# Patient Record
Sex: MALE
[De-identification: names, ages, dates, MRNs, and addresses within clinical notes are randomized per-mention and may not be internally consistent; named-entity substitution may affect disease eponyms.]

---

## 2018-02-23 ENCOUNTER — HOSPITAL ENCOUNTER (OUTPATIENT)
Dept: HOSPITAL 88 - US | Age: 83
End: 2018-02-23
Attending: UROLOGY
Payer: MEDICARE

## 2018-02-23 DIAGNOSIS — N39.46: Primary | ICD-10-CM

## 2018-02-23 PROCEDURE — 76770 US EXAM ABDO BACK WALL COMP: CPT

## 2018-02-23 NOTE — DIAGNOSTIC IMAGING REPORT
EXAM: Renal Ultrasound

INDICATION:        

\S\MIXED INCONTINENCE  

COMPARISON: None 

TECHNIQUE: Transverse and longitudinal images of the kidneys and bladder were

obtained.  



FINDINGS:     



Right Kidney: 

     Size: 11.5 cm

     Echogenicity: Normal   

     Parenchymal thickness: Normal 

     Collecting system: No hydronephrosis

     Stones: None

     Cyst/Mass: None



Left Kidney: 

     Size: 10.9 cm

     Echogenicity: Normal   

     Parenchymal thickness: Normal 

     Collecting system: No hydronephrosis

     Stones: None

     Cyst/Mass: None



Bladder: 

Not fully distended and appears unremarkable.



IMPRESSION:

Unremarkable renal ultrasound exam.



Signed by: Dr. Melecio London MD on 2/23/2018 11:53 AM

## 2018-03-24 ENCOUNTER — HOSPITAL ENCOUNTER (INPATIENT)
Dept: HOSPITAL 88 - ER | Age: 83
LOS: 11 days | Discharge: NURSING, REHAB OR LONG-TERM CARE | DRG: 698 | End: 2018-04-04
Attending: INTERNAL MEDICINE | Admitting: INTERNAL MEDICINE
Payer: MEDICARE

## 2018-03-24 VITALS — HEIGHT: 69 IN | BODY MASS INDEX: 27.46 KG/M2 | WEIGHT: 185.44 LBS

## 2018-03-24 VITALS — DIASTOLIC BLOOD PRESSURE: 67 MMHG | SYSTOLIC BLOOD PRESSURE: 161 MMHG

## 2018-03-24 DIAGNOSIS — Z16.35: ICD-10-CM

## 2018-03-24 DIAGNOSIS — N40.0: ICD-10-CM

## 2018-03-24 DIAGNOSIS — F03.90: ICD-10-CM

## 2018-03-24 DIAGNOSIS — Z95.810: ICD-10-CM

## 2018-03-24 DIAGNOSIS — I48.2: ICD-10-CM

## 2018-03-24 DIAGNOSIS — R07.89: ICD-10-CM

## 2018-03-24 DIAGNOSIS — Z79.01: ICD-10-CM

## 2018-03-24 DIAGNOSIS — I25.10: ICD-10-CM

## 2018-03-24 DIAGNOSIS — I11.0: ICD-10-CM

## 2018-03-24 DIAGNOSIS — J44.9: ICD-10-CM

## 2018-03-24 DIAGNOSIS — A41.51: ICD-10-CM

## 2018-03-24 DIAGNOSIS — I25.2: ICD-10-CM

## 2018-03-24 DIAGNOSIS — I73.9: ICD-10-CM

## 2018-03-24 DIAGNOSIS — I50.20: ICD-10-CM

## 2018-03-24 DIAGNOSIS — K21.9: ICD-10-CM

## 2018-03-24 DIAGNOSIS — E11.9: ICD-10-CM

## 2018-03-24 DIAGNOSIS — R13.10: ICD-10-CM

## 2018-03-24 DIAGNOSIS — T83.511A: Primary | ICD-10-CM

## 2018-03-24 DIAGNOSIS — B96.4: ICD-10-CM

## 2018-03-24 DIAGNOSIS — Z89.431: ICD-10-CM

## 2018-03-24 DIAGNOSIS — Z89.432: ICD-10-CM

## 2018-03-24 DIAGNOSIS — R42: ICD-10-CM

## 2018-03-24 LAB
ALBUMIN SERPL-MCNC: 2.6 G/DL (ref 3.5–5)
ALBUMIN/GLOB SERPL: 0.7 {RATIO} (ref 0.8–2)
ALP SERPL-CCNC: 273 IU/L (ref 40–150)
ALT SERPL-CCNC: 25 IU/L (ref 0–55)
ANION GAP SERPL CALC-SCNC: 11.6 MMOL/L (ref 8–16)
BACTERIA URNS QL MICRO: (no result) /HPF
BASOPHILS # BLD AUTO: 0 10*3/UL (ref 0–0.1)
BASOPHILS NFR BLD AUTO: 0.3 % (ref 0–1)
BILIRUB UR QL: NEGATIVE
BUN SERPL-MCNC: 21 MG/DL (ref 7–26)
BUN/CREAT SERPL: 22 (ref 6–25)
CALCIUM SERPL-MCNC: 8.6 MG/DL (ref 8.4–10.2)
CHLORIDE SERPL-SCNC: 99 MMOL/L (ref 98–107)
CK MB SERPL-MCNC: 0.6 NG/ML (ref 0–5)
CK SERPL-CCNC: 16 IU/L (ref 30–200)
CLARITY UR: (no result)
CO2 SERPL-SCNC: 30 MMOL/L (ref 22–29)
COLOR UR: YELLOW
DEPRECATED INR PLAS: 2.23
DEPRECATED NEUTROPHILS # BLD AUTO: 8.5 10*3/UL (ref 2.1–6.9)
DEPRECATED RBC URNS MANUAL-ACNC: (no result) /HPF (ref 0–5)
EGFRCR SERPLBLD CKD-EPI 2021: > 60 ML/MIN (ref 60–?)
EOSINOPHIL # BLD AUTO: 0 10*3/UL (ref 0–0.4)
EOSINOPHIL NFR BLD AUTO: 0 % (ref 0–6)
EPI CELLS URNS QL MICRO: (no result) /LPF
ERYTHROCYTE [DISTWIDTH] IN CORD BLOOD: 18.6 % (ref 11.7–14.4)
GLOBULIN PLAS-MCNC: 4 G/DL (ref 2.3–3.5)
GLUCOSE SERPLBLD-MCNC: 121 MG/DL (ref 74–118)
HCT VFR BLD AUTO: 44.6 % (ref 38.2–49.6)
HGB BLD-MCNC: 14.6 G/DL (ref 14–18)
KETONES UR QL STRIP.AUTO: (no result)
LEUKOCYTE ESTERASE UR QL STRIP.AUTO: (no result)
LYMPHOCYTES # BLD: 0.2 10*3/UL (ref 1–3.2)
LYMPHOCYTES NFR BLD AUTO: 2.6 % (ref 18–39.1)
MCH RBC QN AUTO: 30.4 PG (ref 28–32)
MCHC RBC AUTO-ENTMCNC: 32.7 G/DL (ref 31–35)
MCV RBC AUTO: 92.9 FL (ref 81–99)
MONOCYTES # BLD AUTO: 0.5 10*3/UL (ref 0.2–0.8)
MONOCYTES NFR BLD AUTO: 5.8 % (ref 4.4–11.3)
NEUTS SEG NFR BLD AUTO: 90.8 % (ref 38.7–80)
NITRITE UR QL STRIP.AUTO: NEGATIVE
PLATELET # BLD AUTO: 220 X10E3/UL (ref 140–360)
POTASSIUM SERPL-SCNC: 3.6 MMOL/L (ref 3.5–5.1)
PROT UR QL STRIP.AUTO: NEGATIVE
PROTHROMBIN TIME: 23.2 SECONDS (ref 11.9–14.5)
RBC # BLD AUTO: 4.8 X10E6/UL (ref 4.3–5.7)
SODIUM SERPL-SCNC: 137 MMOL/L (ref 136–145)
SP GR UR STRIP: 1.01 (ref 1.01–1.02)
UROBILINOGEN UR STRIP-MCNC: 1 MG/DL (ref 0.2–1)
WBC #/AREA URNS HPF: (no result) /HPF (ref 0–5)

## 2018-03-24 PROCEDURE — 93005 ELECTROCARDIOGRAM TRACING: CPT

## 2018-03-24 PROCEDURE — 74230 X-RAY XM SWLNG FUNCJ C+: CPT

## 2018-03-24 PROCEDURE — 78452 HT MUSCLE IMAGE SPECT MULT: CPT

## 2018-03-24 PROCEDURE — 96376 TX/PRO/DX INJ SAME DRUG ADON: CPT

## 2018-03-24 PROCEDURE — 99284 EMERGENCY DEPT VISIT MOD MDM: CPT

## 2018-03-24 PROCEDURE — 87040 BLOOD CULTURE FOR BACTERIA: CPT

## 2018-03-24 PROCEDURE — 93017 CV STRESS TEST TRACING ONLY: CPT

## 2018-03-24 PROCEDURE — 83735 ASSAY OF MAGNESIUM: CPT

## 2018-03-24 PROCEDURE — 84484 ASSAY OF TROPONIN QUANT: CPT

## 2018-03-24 PROCEDURE — 36415 COLL VENOUS BLD VENIPUNCTURE: CPT

## 2018-03-24 PROCEDURE — 80048 BASIC METABOLIC PNL TOTAL CA: CPT

## 2018-03-24 PROCEDURE — 51700 IRRIGATION OF BLADDER: CPT

## 2018-03-24 PROCEDURE — 87086 URINE CULTURE/COLONY COUNT: CPT

## 2018-03-24 PROCEDURE — 80076 HEPATIC FUNCTION PANEL: CPT

## 2018-03-24 PROCEDURE — 83690 ASSAY OF LIPASE: CPT

## 2018-03-24 PROCEDURE — 85025 COMPLETE CBC W/AUTO DIFF WBC: CPT

## 2018-03-24 PROCEDURE — 82150 ASSAY OF AMYLASE: CPT

## 2018-03-24 PROCEDURE — 87071 CULTURE AEROBIC QUANT OTHER: CPT

## 2018-03-24 PROCEDURE — 85610 PROTHROMBIN TIME: CPT

## 2018-03-24 PROCEDURE — 82553 CREATINE MB FRACTION: CPT

## 2018-03-24 PROCEDURE — 82550 ASSAY OF CK (CPK): CPT

## 2018-03-24 PROCEDURE — 82948 REAGENT STRIP/BLOOD GLUCOSE: CPT

## 2018-03-24 PROCEDURE — 87205 SMEAR GRAM STAIN: CPT

## 2018-03-24 PROCEDURE — 80053 COMPREHEN METABOLIC PANEL: CPT

## 2018-03-24 PROCEDURE — 93306 TTE W/DOPPLER COMPLETE: CPT

## 2018-03-24 PROCEDURE — 81001 URINALYSIS AUTO W/SCOPE: CPT

## 2018-03-24 PROCEDURE — 83605 ASSAY OF LACTIC ACID: CPT

## 2018-03-24 PROCEDURE — 71045 X-RAY EXAM CHEST 1 VIEW: CPT

## 2018-03-24 PROCEDURE — 76700 US EXAM ABDOM COMPLETE: CPT

## 2018-03-24 PROCEDURE — 87186 SC STD MICRODIL/AGAR DIL: CPT

## 2018-03-24 PROCEDURE — 87400 INFLUENZA A/B EACH AG IA: CPT

## 2018-03-24 RX ADMIN — HYDROCODONE BITARTRATE AND ACETAMINOPHEN PRN EA: 10; 325 TABLET ORAL at 17:30

## 2018-03-24 RX ADMIN — INSULIN HUMAN SCH UNIT: 100 INJECTION, SOLUTION PARENTERAL at 21:00

## 2018-03-24 RX ADMIN — SODIUM CHLORIDE SCH MLS/HR: 9 INJECTION, SOLUTION INTRAVENOUS at 17:30

## 2018-03-24 NOTE — XMS REPORT
Patient Summary Document

 Created on: 2018



HAL AVINA

External Reference #: [id number]

: 1933

Sex: Male



Demographics







 Address  [address] [place] [address]  [id number]

 

 Home Phone  [phone]

 

 Preferred Language  Unknown

 

 Marital Status  Unknown

 

 Shinto Affiliation  Unknown

 

 Race  Unknown

 

 Additional Race(s)  

 

 Ethnic Group  Unknown





Author







 [facility]  [facility]

 

 [facility]

 

 Address  Unknown

 

 Phone  Unavailable







Care Team Providers







 Care Team Member Name  Role  Phone

 

 FABIOLA ALEXANDER  Unavailable  Unavailable







Problems

This patient has no known problems.



Allergies, Adverse Reactions, Alerts

This patient has no known allergies or adverse reactions.



Medications

This patient has no known medications.



Results







 Test Description  Test Time  Test Comments  Text Results  Atomic Results  
Result Comments









 US RENAL RETROPERITONEAL COMP            [place] [address]      Patient Name: 
HAL AVINA   MR #: [id number]    : 1933 Age/Sex: 84/M  Acct #: 
[id number] Req #: [id number]  Adm Physician:     Ordered by: FABIOLA ALEXANDER MD  Report #: [id number]   Location: US  Room/Bed:     __________________________
_________________________________________________________________________    
Procedure: [id number] US/US RENAL RETROPERITONEAL COMP  Exam Date:              
               Exam Time:        REPORT STATUS: Signed    EXAM: Renal 
Ultrasound   INDICATION:              COMPARISON: None    TECHNIQUE: Transverse 
and longitudinal images of the kidneys and bladder were   obtained.        
FINDINGS:           Right Kidney:         Size: 11.5 cm        Echogenicity: 
Normal           Parenchymal thickness: Normal         Collecting system: No 
hydronephrosis        Stones: None        Cyst/Mass: None      Left Kidney:    
     Size: 10.9 cm        Echogenicity: Normal           Parenchymal thickness: 
Normal         Collecting system: No hydronephrosis        Stones: None        
Cyst/Mass: None      Bladder:    Not fully distended and appears unremarkable. 
     IMPRESSION:   Unremarkable renal ultrasound exam.      Signed by: Dr. Melecio London MD on 2018 11:53 AM        Dictated By: MELECIO LONDON MD  
Electronically Signed By: MELECIO LONDON MD on 18 1152  Transcribed By: 
SHALA on 18 1150       COPY TO:   FABIOLA ALEXANDER MD

## 2018-03-24 NOTE — DIAGNOSTIC IMAGING REPORT
Examination: Single AP view of the chest.



COMPARISON: None.



INDICATION: Fever, AMS

    

IMPRESSION:

 

1.  Lines and Tubes: Left upper chest multilead cardiac device.

2.  Enlarged cardiac silhouette, central pulmonary venous congestion and mild

perihilar interstitial edema, likely representing mildly decompensated CHF..

3.  No consolidation or effusion.  

4.  No acute bony abnormalities.



Signed by: Dr. J Carlos Nguyen M.D. on 3/24/2018 4:38 PM

## 2018-03-25 VITALS — DIASTOLIC BLOOD PRESSURE: 58 MMHG | SYSTOLIC BLOOD PRESSURE: 122 MMHG

## 2018-03-25 VITALS — SYSTOLIC BLOOD PRESSURE: 151 MMHG | DIASTOLIC BLOOD PRESSURE: 64 MMHG

## 2018-03-25 VITALS — DIASTOLIC BLOOD PRESSURE: 70 MMHG | SYSTOLIC BLOOD PRESSURE: 158 MMHG

## 2018-03-25 VITALS — SYSTOLIC BLOOD PRESSURE: 130 MMHG | DIASTOLIC BLOOD PRESSURE: 60 MMHG

## 2018-03-25 VITALS — SYSTOLIC BLOOD PRESSURE: 161 MMHG | DIASTOLIC BLOOD PRESSURE: 67 MMHG

## 2018-03-25 VITALS — SYSTOLIC BLOOD PRESSURE: 141 MMHG | DIASTOLIC BLOOD PRESSURE: 80 MMHG

## 2018-03-25 VITALS — SYSTOLIC BLOOD PRESSURE: 122 MMHG | DIASTOLIC BLOOD PRESSURE: 56 MMHG

## 2018-03-25 LAB
ANION GAP SERPL CALC-SCNC: 15.7 MMOL/L (ref 8–16)
BASOPHILS # BLD AUTO: 0 10*3/UL (ref 0–0.1)
BASOPHILS NFR BLD AUTO: 0.3 % (ref 0–1)
BUN SERPL-MCNC: 22 MG/DL (ref 7–26)
BUN/CREAT SERPL: 26 (ref 6–25)
CALCIUM SERPL-MCNC: 8.6 MG/DL (ref 8.4–10.2)
CHLORIDE SERPL-SCNC: 101 MMOL/L (ref 98–107)
CK MB SERPL-MCNC: 1.1 NG/ML (ref 0–5)
CK MB SERPL-MCNC: 1.4 NG/ML (ref 0–5)
CK SERPL-CCNC: 53 IU/L (ref 30–200)
CK SERPL-CCNC: 55 IU/L (ref 30–200)
CO2 SERPL-SCNC: 27 MMOL/L (ref 22–29)
DEPRECATED NEUTROPHILS # BLD AUTO: 8.3 10*3/UL (ref 2.1–6.9)
EGFRCR SERPLBLD CKD-EPI 2021: > 60 ML/MIN (ref 60–?)
EOSINOPHIL # BLD AUTO: 0 10*3/UL (ref 0–0.4)
EOSINOPHIL NFR BLD AUTO: 0.1 % (ref 0–6)
ERYTHROCYTE [DISTWIDTH] IN CORD BLOOD: 18.6 % (ref 11.7–14.4)
GLUCOSE SERPLBLD-MCNC: 118 MG/DL (ref 74–118)
HCT VFR BLD AUTO: 44 % (ref 38.2–49.6)
HGB BLD-MCNC: 14.5 G/DL (ref 14–18)
LYMPHOCYTES # BLD: 0.4 10*3/UL (ref 1–3.2)
LYMPHOCYTES NFR BLD AUTO: 4.4 % (ref 18–39.1)
MCH RBC QN AUTO: 30.7 PG (ref 28–32)
MCHC RBC AUTO-ENTMCNC: 33 G/DL (ref 31–35)
MCV RBC AUTO: 93.2 FL (ref 81–99)
MONOCYTES # BLD AUTO: 0.8 10*3/UL (ref 0.2–0.8)
MONOCYTES NFR BLD AUTO: 8.1 % (ref 4.4–11.3)
NEUTS SEG NFR BLD AUTO: 86.5 % (ref 38.7–80)
PLATELET # BLD AUTO: 218 X10E3/UL (ref 140–360)
POTASSIUM SERPL-SCNC: 3.7 MMOL/L (ref 3.5–5.1)
RBC # BLD AUTO: 4.72 X10E6/UL (ref 4.3–5.7)
SODIUM SERPL-SCNC: 140 MMOL/L (ref 136–145)

## 2018-03-25 RX ADMIN — SODIUM CHLORIDE SCH GM: 9 INJECTION, SOLUTION INTRAVENOUS at 16:49

## 2018-03-25 RX ADMIN — FUROSEMIDE SCH MG: 20 TABLET ORAL at 16:49

## 2018-03-25 RX ADMIN — INSULIN HUMAN SCH UNIT: 100 INJECTION, SOLUTION PARENTERAL at 19:59

## 2018-03-25 RX ADMIN — INSULIN HUMAN SCH UNIT: 100 INJECTION, SOLUTION PARENTERAL at 07:30

## 2018-03-25 RX ADMIN — LATANOPROST SCH ML: 50 SOLUTION OPHTHALMIC at 20:42

## 2018-03-25 RX ADMIN — HYDROCODONE BITARTRATE AND ACETAMINOPHEN PRN EA: 10; 325 TABLET ORAL at 08:49

## 2018-03-25 RX ADMIN — INSULIN HUMAN SCH UNIT: 100 INJECTION, SOLUTION PARENTERAL at 11:30

## 2018-03-25 RX ADMIN — CLONAZEPAM SCH MG: 0.5 TABLET ORAL at 20:42

## 2018-03-25 RX ADMIN — CLONAZEPAM SCH MG: 0.5 TABLET ORAL at 16:49

## 2018-03-25 RX ADMIN — Medication SCH MG: at 20:43

## 2018-03-25 RX ADMIN — INSULIN HUMAN SCH UNIT: 100 INJECTION, SOLUTION PARENTERAL at 16:30

## 2018-03-25 RX ADMIN — MEMANTINE HYDROCHLORIDE SCH MG: 10 TABLET ORAL at 16:49

## 2018-03-25 RX ADMIN — ASPIRIN SCH MG: 81 TABLET, COATED ORAL at 08:49

## 2018-03-25 RX ADMIN — LATANOPROST SCH ML: 50 SOLUTION OPHTHALMIC at 20:47

## 2018-03-25 RX ADMIN — Medication SCH MG: at 20:42

## 2018-03-25 RX ADMIN — Medication SCH MG: at 16:49

## 2018-03-25 RX ADMIN — SODIUM CHLORIDE SCH MLS/HR: 9 INJECTION, SOLUTION INTRAVENOUS at 13:12

## 2018-03-25 NOTE — PROGRESS NOTE
DATE:  March 25, 2018 



CARDIOLOGY PROGRESS NOTE



SUBJECTIVE:  Patient is without any complaints this morning.  He denies any 

chest pain or shortness of breath.  States that he is tired.



CARDIOVASCULAR MEDICATIONS:  Aspirin 81 mg p.o. daily.



OBJECTIVE:

VITAL SIGNS:  Temperature 97.3, pulse 79, respiratory rate 18, blood 

pressure 130/60, oxygen saturation 96% on room air.

GENERAL:  Alert and oriented x2, resting comfortably in bed, does not 

appear to be in any acute distress.

CARDIOVASCULAR:  Regular rate and rhythm.  S4 gallop.

LUNGS:  Diminished breath sounds posterior lower lobes, otherwise scattered 

rhonchi.  No wheezing or crackles noted.

ABDOMEN:  Rounded, soft, nontender.

EXTREMITIES:  Lower extremities, trace pedal pulses.



LABS:  WBC 9.60, hemoglobin 14.5, hematocrit 44.0, and platelets 218.  

Sodium 140, potassium 3.7, BUN 22, creatinine 0.86.  PT 23.2, INR 2.23.  

Chest x-ray reviewed from yesterday with no acute bony abnormality, no 

consolidation or effusion, central pulmonary venous congestion, mildly 

decompensated CHF.



TELEMETRY:  Ventricularly paced rhythm.



ASSESSMENT

1. Atypical chest pain.

2. Peripheral arterial disease, status post multiple interventions 

recently.

3. Permanent atrial fibrillation.

4. Recent transmetatarsal amputation.



RECOMMENDATIONS:  We will be reconciling meds this morning.  Continuation 

of home meds including anticoagulation.  Scheduled for a stress test 

tomorrow morning.  Monitor closely.  We will continue to follow closely.



Dictated By:  Amaris Freeman NP









DD:  03/25/2018 10:36

DT:  03/25/2018 11:05

Job#:  [id number] [facility]

## 2018-03-25 NOTE — CONSULTATION
DATE OF CONSULTATION:  March 24, 2018 



CARDIOLOGY CONSULTATION NOTE



REASON FOR CONSULTATION:  Chest pain.



HISTORY:  Mr. Lozada is a patient of mine for the last several years.  He 

comes into the emergency room from his nursing home with complaints of 

fever, shaking chills as well as chest pain.  He has peripheral arterial 

disease.  He underwent recent intervention on both his lower extremities 

successfully.  He has bilateral transmet amputations.  He lives in a 

nursing home.



At the time of admission, his temperature was 100 degrees Fahrenheit, and 

his chest x-ray showed mildly decompensated heart failure.



PAST MEDICAL, SOCIAL, AND FAMILY HISTORY:  Unchanged from prior visit.



ALLERGIES:  NO KNOWN DRUG ALLERGIES.



REVIEW OF SYSTEMS:  Unobtainable.



PHYSICAL EXAMINATION:

VITAL SIGNS:  Temperature 100 degrees, heart rate 80, blood pressure 

127/80.  O2 sat is 96%.

CARDIOVASCULAR:  Regular rhythm.  S4 gallop.

LUNGS:  Decreased breath sounds.  Occasional rhonchi.

EXTREMITIES:  Pedal pulses are trace positive.



LABORATORY DATA:  Hemoglobin is 14.6.  Serum creatinine is normal.  Cardiac 

enzymes are negative.



ASSESSMENT:

1. Atypical chest pain.

2. Peripheral arterial disease.

3. Atrial fibrillation.



RECOMMENDATIONS:  Anticoagulation should be continued.  The patient was 

scheduled for a stress test in my office.  We will perform this while he is 

inpatient in the hospital.



I thank Dr. Burns for this consultation.







DD:  03/24/2018 18:39

DT:  03/25/2018 00:14

Job#:  [id number]

## 2018-03-25 NOTE — DIAGNOSTIC IMAGING REPORT
EXAM: XR CHEST 1 VIEW



DATE: 3/25/2018 7:00 AM



INDICATION: CHF, fever 



COMPARISON: 3/24/2018



FINDINGS:



Lines and Tubes: Left chest wall pacemaker with lead overlying right ventricle

and coronary sinus.



Heart and Mediastinum: Heart is enlarged. Tortuous aorta.



Lungs and Pleura: Scattered mild to moderate bilateral airspace opacities.



Bones and Soft Tissues: No acute findings.



IMPRESSION: 

1.  Enlarged cardiac silhouette with mild to moderate edema. Superimposed

pneumonia not excluded.



Signed by: Dr. Enzo Seymour MD on 3/25/2018 12:46 PM

## 2018-03-26 VITALS — DIASTOLIC BLOOD PRESSURE: 59 MMHG | SYSTOLIC BLOOD PRESSURE: 131 MMHG

## 2018-03-26 VITALS — SYSTOLIC BLOOD PRESSURE: 131 MMHG | DIASTOLIC BLOOD PRESSURE: 59 MMHG

## 2018-03-26 VITALS — SYSTOLIC BLOOD PRESSURE: 144 MMHG | DIASTOLIC BLOOD PRESSURE: 63 MMHG

## 2018-03-26 VITALS — DIASTOLIC BLOOD PRESSURE: 60 MMHG | SYSTOLIC BLOOD PRESSURE: 140 MMHG

## 2018-03-26 VITALS — DIASTOLIC BLOOD PRESSURE: 65 MMHG | SYSTOLIC BLOOD PRESSURE: 136 MMHG

## 2018-03-26 VITALS — SYSTOLIC BLOOD PRESSURE: 153 MMHG | DIASTOLIC BLOOD PRESSURE: 70 MMHG

## 2018-03-26 VITALS — SYSTOLIC BLOOD PRESSURE: 150 MMHG | DIASTOLIC BLOOD PRESSURE: 67 MMHG

## 2018-03-26 RX ADMIN — CLONAZEPAM SCH MG: 0.5 TABLET ORAL at 16:00

## 2018-03-26 RX ADMIN — MEMANTINE HYDROCHLORIDE SCH MG: 10 TABLET ORAL at 16:24

## 2018-03-26 RX ADMIN — INSULIN HUMAN SCH UNIT: 100 INJECTION, SOLUTION PARENTERAL at 11:30

## 2018-03-26 RX ADMIN — Medication SCH MG: at 09:00

## 2018-03-26 RX ADMIN — Medication SCH MG: at 22:17

## 2018-03-26 RX ADMIN — SODIUM CHLORIDE SCH MLS/HR: 9 INJECTION, SOLUTION INTRAVENOUS at 22:16

## 2018-03-26 RX ADMIN — POTASSIUM CHLORIDE SCH MEQ: 1500 TABLET, EXTENDED RELEASE ORAL at 09:00

## 2018-03-26 RX ADMIN — FUROSEMIDE SCH MG: 20 TABLET ORAL at 09:00

## 2018-03-26 RX ADMIN — ASPIRIN SCH MG: 81 TABLET, COATED ORAL at 09:00

## 2018-03-26 RX ADMIN — TAMSULOSIN HYDROCHLORIDE SCH MG: 0.4 CAPSULE ORAL at 09:00

## 2018-03-26 RX ADMIN — PANTOPRAZOLE SODIUM SCH MG: 40 TABLET, DELAYED RELEASE ORAL at 07:30

## 2018-03-26 RX ADMIN — CLONAZEPAM SCH MG: 0.5 TABLET ORAL at 09:00

## 2018-03-26 RX ADMIN — INSULIN HUMAN SCH UNIT: 100 INJECTION, SOLUTION PARENTERAL at 21:00

## 2018-03-26 RX ADMIN — MEMANTINE HYDROCHLORIDE SCH MG: 10 TABLET ORAL at 09:00

## 2018-03-26 RX ADMIN — METOPROLOL TARTRATE SCH MG: 25 TABLET, FILM COATED ORAL at 09:00

## 2018-03-26 RX ADMIN — RIVAROXABAN SCH MG: 10 TABLET, FILM COATED ORAL at 09:00

## 2018-03-26 RX ADMIN — FUROSEMIDE SCH MG: 20 TABLET ORAL at 16:24

## 2018-03-26 RX ADMIN — INSULIN HUMAN SCH UNIT: 100 INJECTION, SOLUTION PARENTERAL at 07:30

## 2018-03-26 RX ADMIN — LATANOPROST SCH ML: 50 SOLUTION OPHTHALMIC at 21:00

## 2018-03-26 RX ADMIN — SODIUM CHLORIDE SCH GM: 9 INJECTION, SOLUTION INTRAVENOUS at 16:24

## 2018-03-26 RX ADMIN — INSULIN HUMAN SCH UNIT: 100 INJECTION, SOLUTION PARENTERAL at 15:40

## 2018-03-26 RX ADMIN — Medication SCH MG: at 16:24

## 2018-03-26 RX ADMIN — CLONAZEPAM SCH MG: 0.5 TABLET ORAL at 22:17

## 2018-03-26 RX ADMIN — ASPIRIN 81 MG CHEWABLE TABLET SCH MG: 81 TABLET CHEWABLE at 09:00

## 2018-03-26 NOTE — CARDIOLOGY REPORT
DATE OF STUDY:  March 26, 2018 



PROCEDURE TITLE  Rest stress single isotope SPECT imaging with 

pharmacologic stress and gated SPECT imaging.



INDICATIONS:  Chest pain.



PROCEDURE:  Pharmacologic stress testing was performed with regadenoson per 

protocol.  Heart rate was 80 beats per minute at rest and increased to 83 

beats per minute during the regadenoson infusion.  The rest blood pressure 

was is 130/70 and increased to 151/71 mmHg, which is a normal response.   

The patient did not develop any significant symptoms during the procedure.  

Resting electrocardiogram demonstrated V-paced.  There were no ST segment 

changes consistent with myocardial ischemia.  Occasional PVCs were noted in 

recovery.  



Myocardial perfusion imaging was performed at rest following the injection 

of 10.3 millicuries of tetrofosmin.  At peak pharmacologic effect, the 

patient was injected with 29 mCi of tetrofosmin.  Gated post stress 

tomographic imaging was performed.



FINDINGS:

The overall quality of the study is fair.  Left ventricular cavity is noted 

to be normal size on the rest and stress studies.  SPECT images demonstrate 

a small mild inferolateral perfusion defect at rest that improves but does 

not completely reverse with stress.  Gated SPECT imaging demonstrates 

hypokinesis of the inferolateral wall.  The left ventricular ejection 

pressure was calculated to be 57%.  



IMPRESSION:  Myocardial perfusion imaging is abnormal.  There is a small 

non-transmural scar in the inferolateral wall.  Overall left ventricular 

systolic function is normal.  Overall left ventricular systolic function 

was normal with regional wall abnormalities as documented above.  











DD:  03/26/2018 18:38

DT:  03/26/2018 19:15

Job#:  [id number]



cc:   KAYLEE FINK MD 



MTDD

## 2018-03-27 VITALS — SYSTOLIC BLOOD PRESSURE: 105 MMHG | DIASTOLIC BLOOD PRESSURE: 57 MMHG

## 2018-03-27 VITALS — SYSTOLIC BLOOD PRESSURE: 135 MMHG | DIASTOLIC BLOOD PRESSURE: 60 MMHG

## 2018-03-27 VITALS — DIASTOLIC BLOOD PRESSURE: 63 MMHG | SYSTOLIC BLOOD PRESSURE: 136 MMHG

## 2018-03-27 VITALS — DIASTOLIC BLOOD PRESSURE: 73 MMHG | SYSTOLIC BLOOD PRESSURE: 136 MMHG

## 2018-03-27 VITALS — DIASTOLIC BLOOD PRESSURE: 71 MMHG | SYSTOLIC BLOOD PRESSURE: 148 MMHG

## 2018-03-27 VITALS — SYSTOLIC BLOOD PRESSURE: 136 MMHG | DIASTOLIC BLOOD PRESSURE: 73 MMHG

## 2018-03-27 VITALS — DIASTOLIC BLOOD PRESSURE: 63 MMHG | SYSTOLIC BLOOD PRESSURE: 132 MMHG

## 2018-03-27 VITALS — DIASTOLIC BLOOD PRESSURE: 62 MMHG | SYSTOLIC BLOOD PRESSURE: 130 MMHG

## 2018-03-27 RX ADMIN — Medication SCH MG: at 10:19

## 2018-03-27 RX ADMIN — Medication SCH MG: at 22:30

## 2018-03-27 RX ADMIN — RIVAROXABAN SCH MG: 10 TABLET, FILM COATED ORAL at 10:19

## 2018-03-27 RX ADMIN — CLONAZEPAM SCH MG: 0.5 TABLET ORAL at 10:19

## 2018-03-27 RX ADMIN — ASPIRIN 81 MG CHEWABLE TABLET SCH MG: 81 TABLET CHEWABLE at 10:18

## 2018-03-27 RX ADMIN — MEMANTINE HYDROCHLORIDE SCH MG: 10 TABLET ORAL at 17:01

## 2018-03-27 RX ADMIN — INSULIN HUMAN SCH UNIT: 100 INJECTION, SOLUTION PARENTERAL at 11:30

## 2018-03-27 RX ADMIN — INSULIN HUMAN SCH UNIT: 100 INJECTION, SOLUTION PARENTERAL at 07:30

## 2018-03-27 RX ADMIN — INSULIN HUMAN SCH UNIT: 100 INJECTION, SOLUTION PARENTERAL at 21:00

## 2018-03-27 RX ADMIN — CLONAZEPAM SCH MG: 0.5 TABLET ORAL at 17:00

## 2018-03-27 RX ADMIN — POTASSIUM CHLORIDE SCH MEQ: 1500 TABLET, EXTENDED RELEASE ORAL at 10:19

## 2018-03-27 RX ADMIN — MEMANTINE HYDROCHLORIDE SCH MG: 10 TABLET ORAL at 10:19

## 2018-03-27 RX ADMIN — ASPIRIN SCH MG: 81 TABLET, COATED ORAL at 10:18

## 2018-03-27 RX ADMIN — METOPROLOL TARTRATE SCH MG: 25 TABLET, FILM COATED ORAL at 10:19

## 2018-03-27 RX ADMIN — TAMSULOSIN HYDROCHLORIDE SCH MG: 0.4 CAPSULE ORAL at 10:18

## 2018-03-27 RX ADMIN — LATANOPROST SCH ML: 50 SOLUTION OPHTHALMIC at 21:00

## 2018-03-27 RX ADMIN — Medication SCH MG: at 10:18

## 2018-03-27 RX ADMIN — PANTOPRAZOLE SODIUM SCH MG: 40 TABLET, DELAYED RELEASE ORAL at 10:18

## 2018-03-27 RX ADMIN — SODIUM CHLORIDE SCH MLS/HR: 9 INJECTION, SOLUTION INTRAVENOUS at 05:12

## 2018-03-27 RX ADMIN — MEROPENEM SCH GM: 1 INJECTION INTRAVENOUS at 19:19

## 2018-03-27 RX ADMIN — INSULIN HUMAN SCH UNIT: 100 INJECTION, SOLUTION PARENTERAL at 16:30

## 2018-03-27 RX ADMIN — FUROSEMIDE SCH MG: 20 TABLET ORAL at 17:00

## 2018-03-27 RX ADMIN — FUROSEMIDE SCH MG: 20 TABLET ORAL at 10:19

## 2018-03-27 RX ADMIN — Medication SCH MG: at 17:00

## 2018-03-27 RX ADMIN — CLONAZEPAM SCH MG: 0.5 TABLET ORAL at 22:30

## 2018-03-27 NOTE — PROGRESS NOTE
DATE:  March 27, 2018 



CARDIOLOGY PROGRESS NOTE



SUBJECTIVE:  The patient denies chest pain or shortness of breath.  



OBJECTIVE 

VITALS:  Temperature 97.9 degrees, pulse 80, respiratory rate 19, blood 

pressure 136/73, oxygen saturation 96% on room air.

GENERAL:  Awake, alert and in no acute distress.  

LUNGS:  Clear to auscultation bilaterally.  No wheezes or crackles.

CARDIOVASCULAR:  Normal rate.  Regular rhythm.  No murmur.  

ABDOMEN:  Soft and nontender.

EXTREMITIES:  No edema. 



CARDIAC MEDICATIONS 

1.  Xarelto 20 mg p.o. daily.

2.  Metoprolol tartrate 75 mg p.o. daily.

3.  Lasix 20 mg p.o. b.i.d. 

4.  Aspirin 81 mg p.o. daily.



LABS:  None today.  Telemetry is V-paced. 



IMPRESSION 

1.  Atypical chest pain.

2.  Peripheral arterial disease:  Status post multiple interventions.

3.  Permanent atrial fibrillation.

4.  Recent transmetatarsal amputation.



RECOMMENDATIONS:  Continue current cardiac medications.  Stress test was 

without evidence of ischemia.  The patient can be discharged from a cardiac 

standpoint.



Thank you for this consult.  We will continue to follow.  









DD:  03/27/2018 13:59

DT:  03/27/2018 14:18

Job#:  [id number]

## 2018-03-28 VITALS — SYSTOLIC BLOOD PRESSURE: 141 MMHG | DIASTOLIC BLOOD PRESSURE: 66 MMHG

## 2018-03-28 VITALS — DIASTOLIC BLOOD PRESSURE: 62 MMHG | SYSTOLIC BLOOD PRESSURE: 135 MMHG

## 2018-03-28 VITALS — SYSTOLIC BLOOD PRESSURE: 159 MMHG | DIASTOLIC BLOOD PRESSURE: 65 MMHG

## 2018-03-28 VITALS — SYSTOLIC BLOOD PRESSURE: 169 MMHG | DIASTOLIC BLOOD PRESSURE: 76 MMHG

## 2018-03-28 VITALS — DIASTOLIC BLOOD PRESSURE: 60 MMHG | SYSTOLIC BLOOD PRESSURE: 132 MMHG

## 2018-03-28 VITALS — SYSTOLIC BLOOD PRESSURE: 131 MMHG | DIASTOLIC BLOOD PRESSURE: 64 MMHG

## 2018-03-28 RX ADMIN — FUROSEMIDE SCH MG: 20 TABLET ORAL at 16:52

## 2018-03-28 RX ADMIN — MEROPENEM SCH GM: 1 INJECTION INTRAVENOUS at 05:58

## 2018-03-28 RX ADMIN — INSULIN HUMAN SCH UNIT: 100 INJECTION, SOLUTION PARENTERAL at 21:00

## 2018-03-28 RX ADMIN — PANTOPRAZOLE SODIUM SCH MG: 40 TABLET, DELAYED RELEASE ORAL at 09:48

## 2018-03-28 RX ADMIN — INSULIN HUMAN SCH UNIT: 100 INJECTION, SOLUTION PARENTERAL at 16:03

## 2018-03-28 RX ADMIN — POTASSIUM CHLORIDE SCH MEQ: 1500 TABLET, EXTENDED RELEASE ORAL at 09:51

## 2018-03-28 RX ADMIN — MEMANTINE HYDROCHLORIDE SCH MG: 10 TABLET ORAL at 09:51

## 2018-03-28 RX ADMIN — Medication SCH MG: at 09:48

## 2018-03-28 RX ADMIN — Medication SCH MG: at 21:06

## 2018-03-28 RX ADMIN — CLONAZEPAM SCH MG: 0.5 TABLET ORAL at 09:51

## 2018-03-28 RX ADMIN — INSULIN HUMAN SCH UNIT: 100 INJECTION, SOLUTION PARENTERAL at 07:30

## 2018-03-28 RX ADMIN — MEROPENEM SCH GM: 1 INJECTION INTRAVENOUS at 16:52

## 2018-03-28 RX ADMIN — Medication PRN MG: at 12:57

## 2018-03-28 RX ADMIN — TAMSULOSIN HYDROCHLORIDE SCH MG: 0.4 CAPSULE ORAL at 09:48

## 2018-03-28 RX ADMIN — MEMANTINE HYDROCHLORIDE SCH MG: 10 TABLET ORAL at 16:52

## 2018-03-28 RX ADMIN — FUROSEMIDE SCH MG: 20 TABLET ORAL at 09:51

## 2018-03-28 RX ADMIN — ASPIRIN 81 MG CHEWABLE TABLET SCH MG: 81 TABLET CHEWABLE at 09:48

## 2018-03-28 RX ADMIN — Medication SCH MG: at 09:51

## 2018-03-28 RX ADMIN — RIVAROXABAN SCH MG: 10 TABLET, FILM COATED ORAL at 09:51

## 2018-03-28 RX ADMIN — CLONAZEPAM SCH MG: 0.5 TABLET ORAL at 21:06

## 2018-03-28 RX ADMIN — LATANOPROST SCH ML: 50 SOLUTION OPHTHALMIC at 21:00

## 2018-03-28 RX ADMIN — INSULIN HUMAN SCH UNIT: 100 INJECTION, SOLUTION PARENTERAL at 11:30

## 2018-03-28 RX ADMIN — SODIUM CHLORIDE SCH MLS/HR: 9 INJECTION, SOLUTION INTRAVENOUS at 01:12

## 2018-03-28 RX ADMIN — Medication SCH MG: at 14:15

## 2018-03-28 RX ADMIN — METOPROLOL TARTRATE SCH MG: 25 TABLET, FILM COATED ORAL at 09:51

## 2018-03-28 RX ADMIN — CLONAZEPAM SCH MG: 0.5 TABLET ORAL at 14:15

## 2018-03-28 NOTE — CONSULTATION
DATE OF CONSULTATION:  March 27, 2018 



REASON FOR CONSULTATION:  Recommendation for antibiotic.



HISTORY OF PRESENT ILLNESS:  Patient is an 84-year-old  male, 

who comes to emergency room because of not feeling well, fever, chills.  

The patient who has history of dementia, is not able to provide any 

information.  The patient has history of atrial fibrillation.  He also has 

some chest pain.  He was seen by cardiology.  



The patient was admitted.  Infectious disease was consulted.  



Patient is not able to provide any information.  



LABORATORY DATA:  Reviewed.  His blood culture showing E. coli.  His urine 

culture showing E. coli.  _______ data reviewed.  



MEDICATION:  List reviewed.  He is on meropenem, Namenda, Lasix, Senokot, 

multivitamin, iron sulfate, Tylenol.  



PAST MEDICAL HISTORY:  As above. 



PAST SURGICAL HISTORY:  Cannot be obtained.



ALLERGIES:  NKA. 



SOCIAL HISTORY:  From the nursing home.



PHYSICAL EXAMINATION

GENERAL:  He is currently alert, oriented, does not seem to be in acute 

distress. 

VITALS:  Stable.  Currently afebrile.  

HEENT:  Anicteric. 

NECK:  Supple. 

CHEST:  Clear. 

ABDOMEN:  Soft. 



IMPRESSIONS

1. Sepsis with Escherichia coli.  He also had Proteus mirabilis in the 

urine, which was multidrug resistant.  Agree with meropenem.  Obtain 

ultrasound of the abdomen to rule out other possibilities because 

bacteria in the blood is different than the bacteria in the urine.  

Recheck blood cultures.  Plan on 14 days of antibiotic.  

2. Dementia.  

3. Will follow with you.









DD:  03/27/2018 21:02

DT:  03/27/2018 22:47

Job#:  [id number]

## 2018-03-28 NOTE — DIAGNOSTIC IMAGING REPORT
PROCEDURE:ABDOMINAL ULTRASOUND

COMPARISON:None.

INDICATIONS:r/o infection

 

FINDINGS:

 

Liver: 14.8 cm. Normal hepatic parenchymal echogenicity. No focal mass. 

 

Main portal vein: 1.4 cm. Hepatopedal flow. 

 

Gallbladder: Multiple echogenic mobile gallstones are present. No 

gallbladder wall thickening or pericholecystic fluid. The gallbladder 

is nondistended.

Common Bile Duct: 3.0 mm. No echogenic filling defect. 

Sonographic Gaytan's sign: Normal.

 

Right kidney: 12.8 cm. No solid or cystic mass, echogenic calculi, or 

hydronephrosis. Normal parenchymal echogenicity.

Left kidney: 11.7 cm. No solid or cystic mass, echogenic calculi, or 

hydronephrosis. Normal parenchymal echogenicity. 

 

Spleen: 13.0 cm. No focal mass.

 

The pancreas, aorta, and inferior vena cava were insufficiently 

visualized for comment secondary to overlying bowel gas. 

 

Ascites: None.

 

CONCLUSION:

 

Cholelithiasis. No sonographic evidence of cholecystitis.

 

 

Dictated by:  Robert Lanza M.D. on 3/28/2018 at 11:21     

Electronically approved by:  Robert Lanza M.D. on 3/28/2018 at 11:21

## 2018-03-28 NOTE — PROGRESS NOTE
DATE:  March 28, 2018 



INFECTIOUS DISEASE PROGRESS NOTE



SUBJECTIVE:  Mr. Lozada is about the same, confused, does not seem to be in 

acute distress. No chest pain.



REVIEW OF SYSTEMS:  Otherwise unremarkable.



Patient is still on Xarelto, Lasix and aspirin.



His laboratory data reviewed. His white count 9.6, hemoglobin 14. His 

sodium 140, potassium 3.7, creatinine 0.86. Bilirubin of 2.6. Alkaline 

phosphatase of 273. 



He had ultrasound of the abdomen which showed cholelithiasis, no evidence 

of cholecystitis.



PHYSICAL EXAMINATION

GENERAL:  He is currently alert, does not seem to be in acute distress. 

VITAL SIGNS:  Stable. Afebrile.

HEENT:  He does not appear icteric. 

NECK:  Supple. 

CHEST:  Clear. 

HEART:  S1 and S2. No S3 or S4, no murmur.

ABDOMEN:  Soft. Bowel sounds present. No tenderness. 

EXTREMITIES:  No edema.

SKIN:  No rash.







IMPRESSION:  Sepsis with Escherichia coli. Will check blood cultures. 

Source unclear, may be UTI. Will check amylase, lipase and liver enzymes 

and will follow with you.











DD:  03/28/2018 17:14

DT:  03/28/2018 17:44

Job#:  [id number]

## 2018-03-28 NOTE — PROGRESS NOTE
DATE:  March 28, 2018 



CARDIOLOGY PROGRESS NOTE  



SUBJECTIVE:  The patient denies chest pain or shortness of breath.  



OBJECTIVE  

VITAL SIGNS:  Temperature 97.5 degrees, pulse 80, respiratory rate 10, 

blood pressure 159/65, oxygen saturation 96% on room air.  

GENERAL:  Awake, alert and in no acute distress.  

LUNGS:  Clear to auscultation bilaterally.  No wheezes or crackles.

CARDIOVASCULAR:  Normal rate.  Regular rhythm.  No murmur.  Normal S1 and 

S2.

ABDOMEN:  Soft and nontender.

EXTREMITIES:  Status post bilateral TMA.  



CARDIAC MEDICATIONS 

1.  Xarelto 20 mg p.o. daily.

2.  Metoprolol tartrate 75 mg p.o. daily.

3.  Lasix 20 mg p.o. b.i.d. 

4.  Aspirin 81 mg p.o. daily.



LABS:  None today. 



TELEMETRY:   V-paced.



IMPRESSION 

1. Escherichia coli bacteremia.  

2. Sepsis. 

3. Atypical chest pain.

4. Peripheral arterial disease:  Status post multiple interventions.

5. Permanent atrial fibrillation.

6. Recent transmetatarsal amputation.



RECOMMENDATIONS:  Continue current cardiac medications.  Stress test is 

without evidence of ischemia.  No further cardiac evaluation is indicated 

at this time.  IV antibiotics per infectious disease.  



Thank you for this consult.  We will continue to follow. 







DD:  03/28/2018 21:04

DT:  03/28/2018 21:18

Job#:  [id number]

## 2018-03-29 VITALS — SYSTOLIC BLOOD PRESSURE: 124 MMHG | DIASTOLIC BLOOD PRESSURE: 58 MMHG

## 2018-03-29 VITALS — SYSTOLIC BLOOD PRESSURE: 133 MMHG | DIASTOLIC BLOOD PRESSURE: 68 MMHG

## 2018-03-29 VITALS — SYSTOLIC BLOOD PRESSURE: 162 MMHG | DIASTOLIC BLOOD PRESSURE: 67 MMHG

## 2018-03-29 VITALS — DIASTOLIC BLOOD PRESSURE: 65 MMHG | SYSTOLIC BLOOD PRESSURE: 146 MMHG

## 2018-03-29 VITALS — SYSTOLIC BLOOD PRESSURE: 144 MMHG | DIASTOLIC BLOOD PRESSURE: 63 MMHG

## 2018-03-29 VITALS — DIASTOLIC BLOOD PRESSURE: 67 MMHG | SYSTOLIC BLOOD PRESSURE: 162 MMHG

## 2018-03-29 VITALS — SYSTOLIC BLOOD PRESSURE: 132 MMHG | DIASTOLIC BLOOD PRESSURE: 62 MMHG

## 2018-03-29 LAB
ALBUMIN SERPL-MCNC: 2.1 G/DL (ref 3.5–5)
ALP SERPL-CCNC: 190 IU/L (ref 40–150)
ALT SERPL-CCNC: 14 IU/L (ref 0–55)
AMYLASE SERPL-CCNC: 38 U/L (ref 25–125)
BASOPHILS # BLD AUTO: 0 10*3/UL (ref 0–0.1)
BASOPHILS NFR BLD AUTO: 0.9 % (ref 0–1)
BILIRUB CONJ SERPL-MCNC: 0.4 MG/DL (ref 0–0.5)
DEPRECATED NEUTROPHILS # BLD AUTO: 2.8 10*3/UL (ref 2.1–6.9)
EOSINOPHIL # BLD AUTO: 0.3 10*3/UL (ref 0–0.4)
EOSINOPHIL NFR BLD AUTO: 6.2 % (ref 0–6)
ERYTHROCYTE [DISTWIDTH] IN CORD BLOOD: 17.5 % (ref 11.7–14.4)
HCT VFR BLD AUTO: 42.5 % (ref 38.2–49.6)
HGB BLD-MCNC: 13.7 G/DL (ref 14–18)
LIPASE SERPL-CCNC: 12 U/L (ref 8–78)
LYMPHOCYTES # BLD: 0.7 10*3/UL (ref 1–3.2)
LYMPHOCYTES NFR BLD AUTO: 16.8 % (ref 18–39.1)
MCH RBC QN AUTO: 30.2 PG (ref 28–32)
MCHC RBC AUTO-ENTMCNC: 32.2 G/DL (ref 31–35)
MCV RBC AUTO: 93.8 FL (ref 81–99)
MONOCYTES # BLD AUTO: 0.5 10*3/UL (ref 0.2–0.8)
MONOCYTES NFR BLD AUTO: 11.3 % (ref 4.4–11.3)
NEUTS SEG NFR BLD AUTO: 64.3 % (ref 38.7–80)
PLATELET # BLD AUTO: 241 X10E3/UL (ref 140–360)
RBC # BLD AUTO: 4.53 X10E6/UL (ref 4.3–5.7)

## 2018-03-29 RX ADMIN — Medication SCH MG: at 10:02

## 2018-03-29 RX ADMIN — MEMANTINE HYDROCHLORIDE SCH MG: 10 TABLET ORAL at 10:02

## 2018-03-29 RX ADMIN — METOPROLOL TARTRATE SCH MG: 25 TABLET, FILM COATED ORAL at 10:02

## 2018-03-29 RX ADMIN — CLONAZEPAM SCH MG: 0.5 TABLET ORAL at 15:23

## 2018-03-29 RX ADMIN — INSULIN HUMAN SCH UNIT: 100 INJECTION, SOLUTION PARENTERAL at 16:30

## 2018-03-29 RX ADMIN — MEROPENEM SCH GM: 1 INJECTION INTRAVENOUS at 17:59

## 2018-03-29 RX ADMIN — INSULIN HUMAN SCH UNIT: 100 INJECTION, SOLUTION PARENTERAL at 21:00

## 2018-03-29 RX ADMIN — CLONAZEPAM SCH MG: 0.5 TABLET ORAL at 21:11

## 2018-03-29 RX ADMIN — FUROSEMIDE SCH MG: 20 TABLET ORAL at 17:59

## 2018-03-29 RX ADMIN — RIVAROXABAN SCH MG: 10 TABLET, FILM COATED ORAL at 10:02

## 2018-03-29 RX ADMIN — LATANOPROST SCH ML: 50 SOLUTION OPHTHALMIC at 21:00

## 2018-03-29 RX ADMIN — ASPIRIN 81 MG CHEWABLE TABLET SCH MG: 81 TABLET CHEWABLE at 10:02

## 2018-03-29 RX ADMIN — INSULIN HUMAN SCH UNIT: 100 INJECTION, SOLUTION PARENTERAL at 07:30

## 2018-03-29 RX ADMIN — MEROPENEM SCH GM: 1 INJECTION INTRAVENOUS at 05:46

## 2018-03-29 RX ADMIN — POTASSIUM CHLORIDE SCH MEQ: 1500 TABLET, EXTENDED RELEASE ORAL at 10:02

## 2018-03-29 RX ADMIN — CLONAZEPAM SCH MG: 0.5 TABLET ORAL at 10:02

## 2018-03-29 RX ADMIN — TAMSULOSIN HYDROCHLORIDE SCH MG: 0.4 CAPSULE ORAL at 10:02

## 2018-03-29 RX ADMIN — Medication SCH MG: at 15:23

## 2018-03-29 RX ADMIN — FUROSEMIDE SCH MG: 20 TABLET ORAL at 10:02

## 2018-03-29 RX ADMIN — Medication SCH MG: at 21:11

## 2018-03-29 RX ADMIN — PANTOPRAZOLE SODIUM SCH MG: 40 TABLET, DELAYED RELEASE ORAL at 07:49

## 2018-03-29 RX ADMIN — INSULIN HUMAN SCH UNIT: 100 INJECTION, SOLUTION PARENTERAL at 11:30

## 2018-03-29 RX ADMIN — Medication PRN MG: at 17:59

## 2018-03-29 RX ADMIN — MEMANTINE HYDROCHLORIDE SCH MG: 10 TABLET ORAL at 17:59

## 2018-03-29 NOTE — PROGRESS NOTE
DATE:  March 29, 2018 





SUBJECTIVE:  The patient denies chest pain or shortness of breath.  He 

reports pain at his TMA stump. 



OBJECTIVE

VITAL SIGNS:  Temperature 96.5 degrees, pulse 88, respiratory rate 20, 

blood pressure 162/67, and oxygen saturation 95% on room air. 

GENERAL:  Awake and alert, in no acute distress. 

LUNGS:  Clear to auscultation bilaterally.  No wheezes or crackles. 

CARDIOVASCULAR:  Normal rate.  Regular rhythm.  No murmur.  Normal S1 and 

S2. 

ABDOMEN:  Soft and nontender. 

EXTREMITIES:  Status post bilateral TMA.  No edema. 



CARDIAC MEDICATIONS

1. Rivaroxaban 20 mg p.o. daily. 

2. Metoprolol tartrate 75 mg p.o. daily. 

3. Furosemide 20 mg p.o. b.i.d. 

4. Aspirin 81 mg p.o. daily. 



LABS:  WBC 4.35, hemoglobin 13.7, hematocrit 42.5, and platelets 241. 



TELEMETRY:   V-paced. 



IMPRESSION

1. Escherichia coli bacteremia. 

2. Proteus mirabilis urinary tract infection. 

3. Sepsis secondary to above. 

4. Atypical chest pain. 

5. Peripheral arterial disease, status post multiple interventions. 

6. Permanent atrial fibrillation. 

7. Recent transmetatarsal amputation. 



RECOMMENDATIONS:  Continue current cardiac medications.  Stress test was 

without evidence of ischemia, although he did have old nontransmural 

infarct in the inferolateral wall.  No further cardiac evaluation is 

indicated at this time.  IV antibiotics per infectious disease. 



Thank you for this consult.  We will continue to follow. 









DD:  03/29/2018 11:50

DT:  03/29/2018 12:17

Job#:  [id number] [id number]

## 2018-03-30 VITALS — DIASTOLIC BLOOD PRESSURE: 57 MMHG | SYSTOLIC BLOOD PRESSURE: 114 MMHG

## 2018-03-30 VITALS — DIASTOLIC BLOOD PRESSURE: 65 MMHG | SYSTOLIC BLOOD PRESSURE: 143 MMHG

## 2018-03-30 VITALS — DIASTOLIC BLOOD PRESSURE: 69 MMHG | SYSTOLIC BLOOD PRESSURE: 142 MMHG

## 2018-03-30 VITALS — DIASTOLIC BLOOD PRESSURE: 58 MMHG | SYSTOLIC BLOOD PRESSURE: 106 MMHG

## 2018-03-30 VITALS — SYSTOLIC BLOOD PRESSURE: 134 MMHG | DIASTOLIC BLOOD PRESSURE: 62 MMHG

## 2018-03-30 VITALS — SYSTOLIC BLOOD PRESSURE: 142 MMHG | DIASTOLIC BLOOD PRESSURE: 70 MMHG

## 2018-03-30 VITALS — DIASTOLIC BLOOD PRESSURE: 62 MMHG | SYSTOLIC BLOOD PRESSURE: 134 MMHG

## 2018-03-30 RX ADMIN — Medication SCH MG: at 16:00

## 2018-03-30 RX ADMIN — CLONAZEPAM SCH MG: 0.5 TABLET ORAL at 16:00

## 2018-03-30 RX ADMIN — Medication SCH MG: at 08:50

## 2018-03-30 RX ADMIN — RIVAROXABAN SCH MG: 10 TABLET, FILM COATED ORAL at 08:51

## 2018-03-30 RX ADMIN — Medication SCH MG: at 20:58

## 2018-03-30 RX ADMIN — ASPIRIN 81 MG CHEWABLE TABLET SCH MG: 81 TABLET CHEWABLE at 08:50

## 2018-03-30 RX ADMIN — CLONAZEPAM SCH MG: 0.5 TABLET ORAL at 08:50

## 2018-03-30 RX ADMIN — Medication SCH MG: at 08:51

## 2018-03-30 RX ADMIN — MEMANTINE HYDROCHLORIDE SCH MG: 10 TABLET ORAL at 08:51

## 2018-03-30 RX ADMIN — TAMSULOSIN HYDROCHLORIDE SCH MG: 0.4 CAPSULE ORAL at 08:50

## 2018-03-30 RX ADMIN — CLONAZEPAM SCH MG: 0.5 TABLET ORAL at 20:58

## 2018-03-30 RX ADMIN — INSULIN HUMAN SCH UNIT: 100 INJECTION, SOLUTION PARENTERAL at 16:15

## 2018-03-30 RX ADMIN — INSULIN HUMAN SCH UNIT: 100 INJECTION, SOLUTION PARENTERAL at 20:58

## 2018-03-30 RX ADMIN — MEMANTINE HYDROCHLORIDE SCH MG: 10 TABLET ORAL at 16:24

## 2018-03-30 RX ADMIN — MEROPENEM SCH GM: 1 INJECTION INTRAVENOUS at 05:36

## 2018-03-30 RX ADMIN — MEROPENEM SCH GM: 1 INJECTION INTRAVENOUS at 17:10

## 2018-03-30 RX ADMIN — POTASSIUM CHLORIDE SCH MEQ: 1500 TABLET, EXTENDED RELEASE ORAL at 08:51

## 2018-03-30 RX ADMIN — LATANOPROST SCH ML: 50 SOLUTION OPHTHALMIC at 20:58

## 2018-03-30 RX ADMIN — INSULIN HUMAN SCH UNIT: 100 INJECTION, SOLUTION PARENTERAL at 07:30

## 2018-03-30 RX ADMIN — PANTOPRAZOLE SODIUM SCH MG: 40 TABLET, DELAYED RELEASE ORAL at 08:30

## 2018-03-30 RX ADMIN — METOPROLOL TARTRATE SCH MG: 25 TABLET, FILM COATED ORAL at 08:51

## 2018-03-30 RX ADMIN — FUROSEMIDE SCH MG: 20 TABLET ORAL at 16:24

## 2018-03-30 RX ADMIN — INSULIN HUMAN SCH UNIT: 100 INJECTION, SOLUTION PARENTERAL at 11:30

## 2018-03-30 RX ADMIN — FUROSEMIDE SCH MG: 20 TABLET ORAL at 08:50

## 2018-03-30 NOTE — PROGRESS NOTE
DATE:  March 30, 2018 



CARDIOLOGY PROGRESS NOTE



SUBJECTIVE:  Patient denies chest pain or shortness of breath. 



OBJECTIVE

VITAL SIGNS:  Temperature 96.1 degrees, pulse 88, respiratory rate 18, 

blood pressure 142/69, oxygen saturation 97% on room air.

GENERAL:  Awake, alert, in no acute distress. 

LUNGS:  Clear to auscultation bilaterally. No wheezes or crackles. 

CARDIOVASCULAR:  Normal rate, regular rhythm. No murmur. Normal S1 and S2. 

ABDOMEN:  Soft, nontender. 

EXTREMITIES:  Status post bilateral TMA. No edema. 



CARDIAC MEDICATIONS

1. Rivaroxaban 20 mg p.o. daily.

2. Metoprolol tartrate 75 mg p.o. daily.

3. Furosemide 20 mg p.o. b.i.d. 

4. Aspirin 81 mg p.o. daily.



LABS:  None today.



TELEMETRY:  V-paced.



IMPRESSION

1. Escherichia coli bacteremia.

2. Proteus mirabilis urinary tract infection.

3. Sepsis secondary to above.

4. Atypical chest pain.

5. Peripheral arterial disease status post multiple interventions.

6. Permanent atrial fibrillation.

7. Recent transmetatarsal amputation.



RECOMMENDATIONS:  Continue current cardiac medications. Stress test was 

without evidence of ischemia although he did have an old nontransmural 

infarct on the inferolateral wall. No further cardiac evaluation is 

indicated at this time. IV antibiotics per Infectious Disease.



Thank you for this consult. We will continue to follow.













DD:  03/30/2018 12:27

DT:  03/30/2018 12:39

Job#:  [id number]

## 2018-03-31 VITALS — DIASTOLIC BLOOD PRESSURE: 74 MMHG | SYSTOLIC BLOOD PRESSURE: 159 MMHG

## 2018-03-31 VITALS — SYSTOLIC BLOOD PRESSURE: 138 MMHG | DIASTOLIC BLOOD PRESSURE: 65 MMHG

## 2018-03-31 VITALS — DIASTOLIC BLOOD PRESSURE: 68 MMHG | SYSTOLIC BLOOD PRESSURE: 136 MMHG

## 2018-03-31 VITALS — DIASTOLIC BLOOD PRESSURE: 75 MMHG | SYSTOLIC BLOOD PRESSURE: 165 MMHG

## 2018-03-31 VITALS — SYSTOLIC BLOOD PRESSURE: 126 MMHG | DIASTOLIC BLOOD PRESSURE: 58 MMHG

## 2018-03-31 VITALS — SYSTOLIC BLOOD PRESSURE: 121 MMHG | DIASTOLIC BLOOD PRESSURE: 58 MMHG

## 2018-03-31 RX ADMIN — RIVAROXABAN SCH MG: 10 TABLET, FILM COATED ORAL at 09:16

## 2018-03-31 RX ADMIN — PANTOPRAZOLE SODIUM SCH MG: 40 TABLET, DELAYED RELEASE ORAL at 08:00

## 2018-03-31 RX ADMIN — MEROPENEM SCH GM: 1 INJECTION INTRAVENOUS at 17:43

## 2018-03-31 RX ADMIN — INSULIN HUMAN SCH UNIT: 100 INJECTION, SOLUTION PARENTERAL at 11:30

## 2018-03-31 RX ADMIN — CLONAZEPAM SCH MG: 0.5 TABLET ORAL at 09:16

## 2018-03-31 RX ADMIN — Medication SCH MG: at 20:53

## 2018-03-31 RX ADMIN — TAMSULOSIN HYDROCHLORIDE SCH MG: 0.4 CAPSULE ORAL at 09:15

## 2018-03-31 RX ADMIN — FUROSEMIDE SCH MG: 20 TABLET ORAL at 16:27

## 2018-03-31 RX ADMIN — CLONAZEPAM SCH MG: 0.5 TABLET ORAL at 16:00

## 2018-03-31 RX ADMIN — MEMANTINE HYDROCHLORIDE SCH MG: 10 TABLET ORAL at 16:27

## 2018-03-31 RX ADMIN — INSULIN HUMAN SCH UNIT: 100 INJECTION, SOLUTION PARENTERAL at 16:27

## 2018-03-31 RX ADMIN — Medication SCH MG: at 09:16

## 2018-03-31 RX ADMIN — POTASSIUM CHLORIDE SCH MEQ: 1500 TABLET, EXTENDED RELEASE ORAL at 09:17

## 2018-03-31 RX ADMIN — LATANOPROST SCH ML: 50 SOLUTION OPHTHALMIC at 20:59

## 2018-03-31 RX ADMIN — INSULIN HUMAN SCH UNIT: 100 INJECTION, SOLUTION PARENTERAL at 07:30

## 2018-03-31 RX ADMIN — INSULIN HUMAN SCH UNIT: 100 INJECTION, SOLUTION PARENTERAL at 20:15

## 2018-03-31 RX ADMIN — MEMANTINE HYDROCHLORIDE SCH MG: 10 TABLET ORAL at 09:16

## 2018-03-31 RX ADMIN — METOPROLOL TARTRATE SCH MG: 25 TABLET, FILM COATED ORAL at 09:16

## 2018-03-31 RX ADMIN — FUROSEMIDE SCH MG: 20 TABLET ORAL at 09:16

## 2018-03-31 RX ADMIN — Medication SCH MG: at 09:15

## 2018-03-31 RX ADMIN — CLONAZEPAM SCH MG: 0.5 TABLET ORAL at 20:59

## 2018-03-31 RX ADMIN — MEROPENEM SCH GM: 1 INJECTION INTRAVENOUS at 05:53

## 2018-03-31 RX ADMIN — Medication SCH MG: at 16:00

## 2018-03-31 RX ADMIN — ASPIRIN 81 MG CHEWABLE TABLET SCH MG: 81 TABLET CHEWABLE at 09:15

## 2018-03-31 NOTE — PROGRESS NOTE
DATE:  March 31, 2018 



CARDIOLOGY PROGRESS NOTE



SUBJECTIVE:  Denies chest pain or shortness of breath.  No complaints 

today.  Telemetry, paced rhythm.



OBJECTIVE

VITAL SIGNS:  Heart rate 80, temperature 96.4, respiratory rate 18, blood 

pressure 138/65, O2 sat 96% on room air.

GENERAL:  No acute distress, alert.

NECK:  No JVD.

CHEST:  Clear to auscultation. 

CARDIOVASCULAR:  Regular rate and rhythm.  Normal S1 and S2.  Systolic 

ejection murmur.

ABDOMEN:  Soft. 

EXTREMITIES:  No edema.  Right TMA.  Socks in place.



CARDIOVASCULAR MEDICATIONS

1. Xarelto 20 mg daily.

2. Metoprolol tartrate 75 mg daily, will change for extended-release 

formulation.

3. Furosemide 20 mg b.i.d. 

4. Aspirin 81 mg daily.



LABS:  For today, glucose is 121.



ASSESSMENT

1. Status post bilateral transmetatarsal amputation.

2. Peripheral arterial disease, requiring multiple interventions.

3. Persistent atrial fibrillation.

4. Atypical chest pain.

5. Sepsis secondary to Escherichia coli bacteremia and Proteus mirabilis 

urinary tract infection.



RECOMMENDATIONS:  Continue current cardiovascular medications.  No 

transmural scar in the inferolateral wall noted on stress test.  No 

additional areas of ischemia observed.  No further coronary interventions 

at this point in time.  Continue antibiotics per ID.  Appreciate the 

opportunity to care for Mr. Lozada.









DD:  03/31/2018 13:12

DT:  03/31/2018 14:36

Job#:  [id number] [id number]

## 2018-04-01 VITALS — DIASTOLIC BLOOD PRESSURE: 69 MMHG | SYSTOLIC BLOOD PRESSURE: 144 MMHG

## 2018-04-01 VITALS — DIASTOLIC BLOOD PRESSURE: 60 MMHG | SYSTOLIC BLOOD PRESSURE: 122 MMHG

## 2018-04-01 VITALS — DIASTOLIC BLOOD PRESSURE: 63 MMHG | SYSTOLIC BLOOD PRESSURE: 139 MMHG

## 2018-04-01 VITALS — DIASTOLIC BLOOD PRESSURE: 69 MMHG | SYSTOLIC BLOOD PRESSURE: 138 MMHG

## 2018-04-01 VITALS — DIASTOLIC BLOOD PRESSURE: 63 MMHG | SYSTOLIC BLOOD PRESSURE: 144 MMHG

## 2018-04-01 VITALS — DIASTOLIC BLOOD PRESSURE: 79 MMHG | SYSTOLIC BLOOD PRESSURE: 145 MMHG

## 2018-04-01 RX ADMIN — POTASSIUM CHLORIDE SCH MEQ: 1500 TABLET, EXTENDED RELEASE ORAL at 09:10

## 2018-04-01 RX ADMIN — INSULIN HUMAN SCH UNIT: 100 INJECTION, SOLUTION PARENTERAL at 07:30

## 2018-04-01 RX ADMIN — FUROSEMIDE SCH MG: 20 TABLET ORAL at 09:10

## 2018-04-01 RX ADMIN — FUROSEMIDE SCH MG: 20 TABLET ORAL at 17:08

## 2018-04-01 RX ADMIN — Medication SCH MG: at 09:11

## 2018-04-01 RX ADMIN — PANTOPRAZOLE SODIUM SCH MG: 40 TABLET, DELAYED RELEASE ORAL at 07:30

## 2018-04-01 RX ADMIN — MEMANTINE HYDROCHLORIDE SCH MG: 10 TABLET ORAL at 09:11

## 2018-04-01 RX ADMIN — MEROPENEM SCH GM: 1 INJECTION INTRAVENOUS at 17:08

## 2018-04-01 RX ADMIN — Medication SCH MG: at 20:45

## 2018-04-01 RX ADMIN — LATANOPROST SCH ML: 50 SOLUTION OPHTHALMIC at 20:45

## 2018-04-01 RX ADMIN — INSULIN HUMAN SCH UNIT: 100 INJECTION, SOLUTION PARENTERAL at 11:30

## 2018-04-01 RX ADMIN — MEMANTINE HYDROCHLORIDE SCH MG: 10 TABLET ORAL at 17:08

## 2018-04-01 RX ADMIN — TAMSULOSIN HYDROCHLORIDE SCH MG: 0.4 CAPSULE ORAL at 09:09

## 2018-04-01 RX ADMIN — METOPROLOL TARTRATE SCH MG: 25 TABLET, FILM COATED ORAL at 09:11

## 2018-04-01 RX ADMIN — RIVAROXABAN SCH MG: 10 TABLET, FILM COATED ORAL at 09:11

## 2018-04-01 RX ADMIN — Medication SCH MG: at 09:09

## 2018-04-01 RX ADMIN — ASPIRIN 81 MG CHEWABLE TABLET SCH MG: 81 TABLET CHEWABLE at 09:09

## 2018-04-01 RX ADMIN — MEROPENEM SCH GM: 1 INJECTION INTRAVENOUS at 05:49

## 2018-04-01 RX ADMIN — CLONAZEPAM SCH MG: 0.5 TABLET ORAL at 09:10

## 2018-04-01 RX ADMIN — INSULIN HUMAN SCH UNIT: 100 INJECTION, SOLUTION PARENTERAL at 20:41

## 2018-04-01 RX ADMIN — Medication SCH MG: at 15:00

## 2018-04-01 RX ADMIN — INSULIN HUMAN SCH UNIT: 100 INJECTION, SOLUTION PARENTERAL at 16:30

## 2018-04-01 NOTE — PROGRESS NOTE
DATE:  April 01, 2018 



CARDIOLOGY PROGRESS NOTE



SUBJECTIVE:  Denies chest pain or shortness of breath.  Paced rhythm on 

tele. 



OBJECTIVE  

VITAL SIGNS:  Temperature 96.6, heart rate 80, respiratory rate 18, blood 

pressure 144/67, and O2 sat 99% room air. 

GENERAL:  No acute distress.  Alert. 

NECK:  No JVD. 

CHEST:  Clear to auscultation. 

CARDIOVASCULAR:  Regular rate and rhythm.  Normal S1 and S2.  Systolic 

ejection murmur 1/6. 

ABDOMEN:  Soft. 

EXTREMITIES:  No edema.  Bilateral TMAs. 



CARDIOVASCULAR MEDICATIONS:  Xarelto 20 mg daily, metoprolol 75 mg daily, 

furosemide 10 mg b.i.d., and aspirin 81 mg daily. 



LABORATORY DATA:  Glucose 81. 



ASSESSMENT  

1. Status post bilateral transmetatarsal amputations. 

2. Peripheral arterial disease, requiring multiple interventions. 

3. Persistent atrial fibrillation. 

4. Atypical chest pain. 

5. Sepsis secondary to Escherichia coli bacteremia. 

6. Proteus mirabilis urinary tract infection. 





RECOMMENDATIONS 

1. Continue current cardiovascular medications. 

2. No additional coronary intervention advised at this point; please 

refer to previous abnormal stress test with non-transmural scar in 

inferolateral wall with no additional areas of ischemia.  Continue 

aggressive medical therapy for CAD, antibiotics.  We will follow 

closely. 









DD:  04/01/2018 13:55

DT:  04/01/2018 14:22

Job#:  [id number]

## 2018-04-02 VITALS — DIASTOLIC BLOOD PRESSURE: 65 MMHG | SYSTOLIC BLOOD PRESSURE: 142 MMHG

## 2018-04-02 VITALS — SYSTOLIC BLOOD PRESSURE: 142 MMHG | DIASTOLIC BLOOD PRESSURE: 65 MMHG

## 2018-04-02 VITALS — DIASTOLIC BLOOD PRESSURE: 61 MMHG | SYSTOLIC BLOOD PRESSURE: 131 MMHG

## 2018-04-02 VITALS — SYSTOLIC BLOOD PRESSURE: 133 MMHG | DIASTOLIC BLOOD PRESSURE: 58 MMHG

## 2018-04-02 VITALS — SYSTOLIC BLOOD PRESSURE: 128 MMHG | DIASTOLIC BLOOD PRESSURE: 59 MMHG

## 2018-04-02 VITALS — SYSTOLIC BLOOD PRESSURE: 133 MMHG | DIASTOLIC BLOOD PRESSURE: 63 MMHG

## 2018-04-02 VITALS — SYSTOLIC BLOOD PRESSURE: 149 MMHG | DIASTOLIC BLOOD PRESSURE: 65 MMHG

## 2018-04-02 RX ADMIN — Medication SCH MG: at 21:05

## 2018-04-02 RX ADMIN — Medication SCH MG: at 08:58

## 2018-04-02 RX ADMIN — INSULIN HUMAN SCH UNIT: 100 INJECTION, SOLUTION PARENTERAL at 16:00

## 2018-04-02 RX ADMIN — ASPIRIN 81 MG CHEWABLE TABLET SCH MG: 81 TABLET CHEWABLE at 08:57

## 2018-04-02 RX ADMIN — MEROPENEM SCH GM: 1 INJECTION INTRAVENOUS at 00:28

## 2018-04-02 RX ADMIN — MEROPENEM SCH GM: 1 INJECTION INTRAVENOUS at 13:10

## 2018-04-02 RX ADMIN — FUROSEMIDE SCH MG: 20 TABLET ORAL at 08:58

## 2018-04-02 RX ADMIN — MEROPENEM SCH GM: 1 INJECTION INTRAVENOUS at 18:52

## 2018-04-02 RX ADMIN — INSULIN HUMAN SCH UNIT: 100 INJECTION, SOLUTION PARENTERAL at 07:30

## 2018-04-02 RX ADMIN — METOPROLOL SUCCINATE SCH MG: 25 TABLET, EXTENDED RELEASE ORAL at 08:58

## 2018-04-02 RX ADMIN — POTASSIUM CHLORIDE SCH MEQ: 1500 TABLET, EXTENDED RELEASE ORAL at 09:00

## 2018-04-02 RX ADMIN — TAMSULOSIN HYDROCHLORIDE SCH MG: 0.4 CAPSULE ORAL at 08:57

## 2018-04-02 RX ADMIN — LATANOPROST SCH ML: 50 SOLUTION OPHTHALMIC at 21:05

## 2018-04-02 RX ADMIN — MEMANTINE HYDROCHLORIDE SCH MG: 10 TABLET ORAL at 16:43

## 2018-04-02 RX ADMIN — MEMANTINE HYDROCHLORIDE SCH MG: 10 TABLET ORAL at 08:58

## 2018-04-02 RX ADMIN — PANTOPRAZOLE SODIUM SCH MG: 40 TABLET, DELAYED RELEASE ORAL at 08:02

## 2018-04-02 RX ADMIN — INSULIN HUMAN SCH UNIT: 100 INJECTION, SOLUTION PARENTERAL at 21:00

## 2018-04-02 RX ADMIN — Medication SCH MG: at 08:57

## 2018-04-02 RX ADMIN — Medication SCH MG: at 15:00

## 2018-04-02 RX ADMIN — FUROSEMIDE SCH MG: 20 TABLET ORAL at 16:43

## 2018-04-02 RX ADMIN — MEROPENEM SCH GM: 1 INJECTION INTRAVENOUS at 07:04

## 2018-04-02 RX ADMIN — INSULIN HUMAN SCH UNIT: 100 INJECTION, SOLUTION PARENTERAL at 11:30

## 2018-04-02 NOTE — PROGRESS NOTE
DATE:  April 01, 2018 



Mr. Lozada is doing better.  There are no new complaints.



REVIEW OF SYSTEMS:  Negative.



PHYSICAL EXAMINATION:

GENERAL:  He is alert and oriented, does not seem to be in acute distress.

VITAL SIGNS:  Stable, afebrile.

HEENT:  He is normocephalic, does not appear icteric.

NECK:  Supple.  No JVD, no lymphadenopathy, no thyromegaly.

CHEST:  Clear bilaterally.

HEART:  S1 and S2.  No S3, S4, or murmur.

ABDOMEN:  Soft.  Bowel sounds present.  No tenderness.

EXTREMITIES:  No edema.

SKIN:  No rash.



IMPRESSION:

1. Sepsis with Escherichia coli bacteremia.  To finish 14 days of 

antibiotic.

2. Proteus mirabilis urinary tract infection.

3. Atypical chest pain.

4. Status post bilateral transmetatarsal amputation.

5. Peripheral vascular disease.



Continue the same from infectious disease point of view.  His laboratory 

data reviewed.  Chart reviewed.  To finish 14 days of meropenem.



DD:  04/01/2018 21:00

DT:  04/02/2018 01:46

Job#:  [id number]

## 2018-04-02 NOTE — PROGRESS NOTE
DATE:  April 02, 2018 



CARDIOLOGY PROGRESS NOTE



SUBJECTIVE:  Patient denies chest pain or shortness of breath. 



OBJECTIVE

VITAL SIGNS:  Temperature 97.5 degrees, pulse 79, respiratory rate 18, 

blood pressure 142/65, oxygen saturation 97% on room air.

GENERAL:  Awake, alert, in no acute distress. 

LUNGS:  Clear to auscultation bilaterally. No wheezes or crackles. 

CARDIOVASCULAR:  Normal rate, regular rhythm. Systolic murmur 1/6. Normal 

S1 and S2.

ABDOMEN:  Soft.

EXTREMITIES:  No edema. Status post bilateral TMA.



CARDIAC MEDICATIONS

1. Metoprolol succinate 75 mg p.o. daily.

2. Furosemide 20 mg p.o. b.i.d. 

3. Aspirin 81 mg p.o. daily.

4. Rivaroxaban 20 mg p.o. daily.



LABS:  None today.



TELEMETRY:  V-paced.



IMPRESSION

1. Escherichia coli bacteremia.

2. Proteus mirabilis urinary tract infection.

3. Sepsis secondary to above.

4. Atypical chest pain.

5. Peripheral arterial disease status post multiple interventions.

6. Permanent atrial fibrillation.

7. Recent transmetatarsal amputation bilaterally.



RECOMMENDATIONS:  Continue current cardiac medications. Stress test 

revealed no evidence of ischemia although he did have old nontransmural 

infarct on the inferolateral wall. No further cardiac evaluation is 

indicated at this time. IV antibiotics per Infectious Disease.



Thank you for this consult. We will continue to follow.









DD:  04/02/2018 11:13

DT:  04/02/2018 12:15

Job#:  [id number]







MTDD

## 2018-04-03 VITALS — SYSTOLIC BLOOD PRESSURE: 117 MMHG | DIASTOLIC BLOOD PRESSURE: 62 MMHG

## 2018-04-03 VITALS — SYSTOLIC BLOOD PRESSURE: 135 MMHG | DIASTOLIC BLOOD PRESSURE: 64 MMHG

## 2018-04-03 VITALS — DIASTOLIC BLOOD PRESSURE: 60 MMHG | SYSTOLIC BLOOD PRESSURE: 128 MMHG

## 2018-04-03 VITALS — DIASTOLIC BLOOD PRESSURE: 68 MMHG | SYSTOLIC BLOOD PRESSURE: 141 MMHG

## 2018-04-03 VITALS — DIASTOLIC BLOOD PRESSURE: 69 MMHG | SYSTOLIC BLOOD PRESSURE: 136 MMHG

## 2018-04-03 VITALS — DIASTOLIC BLOOD PRESSURE: 61 MMHG | SYSTOLIC BLOOD PRESSURE: 131 MMHG

## 2018-04-03 RX ADMIN — MEROPENEM SCH GM: 1 INJECTION INTRAVENOUS at 13:04

## 2018-04-03 RX ADMIN — Medication SCH MG: at 21:00

## 2018-04-03 RX ADMIN — POTASSIUM CHLORIDE SCH MEQ: 1500 TABLET, EXTENDED RELEASE ORAL at 08:56

## 2018-04-03 RX ADMIN — ASPIRIN 81 MG CHEWABLE TABLET SCH MG: 81 TABLET CHEWABLE at 08:55

## 2018-04-03 RX ADMIN — Medication SCH MG: at 08:55

## 2018-04-03 RX ADMIN — INSULIN HUMAN SCH UNIT: 100 INJECTION, SOLUTION PARENTERAL at 16:30

## 2018-04-03 RX ADMIN — LATANOPROST SCH ML: 50 SOLUTION OPHTHALMIC at 21:00

## 2018-04-03 RX ADMIN — TAMSULOSIN HYDROCHLORIDE SCH MG: 0.4 CAPSULE ORAL at 08:55

## 2018-04-03 RX ADMIN — Medication SCH MG: at 15:20

## 2018-04-03 RX ADMIN — METOPROLOL SUCCINATE SCH MG: 25 TABLET, EXTENDED RELEASE ORAL at 08:56

## 2018-04-03 RX ADMIN — PANTOPRAZOLE SODIUM SCH MG: 40 TABLET, DELAYED RELEASE ORAL at 08:55

## 2018-04-03 RX ADMIN — Medication SCH MG: at 08:56

## 2018-04-03 RX ADMIN — INSULIN HUMAN SCH UNIT: 100 INJECTION, SOLUTION PARENTERAL at 11:30

## 2018-04-03 RX ADMIN — FUROSEMIDE SCH MG: 20 TABLET ORAL at 17:00

## 2018-04-03 RX ADMIN — MEROPENEM SCH GM: 1 INJECTION INTRAVENOUS at 06:35

## 2018-04-03 RX ADMIN — MEROPENEM SCH GM: 1 INJECTION INTRAVENOUS at 19:45

## 2018-04-03 RX ADMIN — MEMANTINE HYDROCHLORIDE SCH MG: 10 TABLET ORAL at 17:00

## 2018-04-03 RX ADMIN — MEMANTINE HYDROCHLORIDE SCH MG: 10 TABLET ORAL at 08:55

## 2018-04-03 RX ADMIN — INSULIN HUMAN SCH UNIT: 100 INJECTION, SOLUTION PARENTERAL at 20:45

## 2018-04-03 RX ADMIN — MEROPENEM SCH GM: 1 INJECTION INTRAVENOUS at 01:20

## 2018-04-03 RX ADMIN — INSULIN HUMAN SCH UNIT: 100 INJECTION, SOLUTION PARENTERAL at 07:30

## 2018-04-03 RX ADMIN — FUROSEMIDE SCH MG: 20 TABLET ORAL at 08:55

## 2018-04-03 NOTE — PROGRESS NOTE
DATE:  April 03, 2018 



CARDIOLOGY PROGRESS NOTE



SUBJECTIVE:  The patient denies chest pain. 



OBJECTIVE  

VITAL SIGNS:  Temperature 96.6 degrees, pulse 80, respiratory rate 20, 

blood pressure 121/68, oxygen saturation 96% on room air.  

GENERAL:  Awake, alert, in no acute distress.

LUNGS:  Clear to auscultation bilaterally.  No wheezes or crackles. 

CARDIOVASCULAR:  Normal rate, regular rhythm.  Systolic murmur 1/6.  Normal 

S1, S2.  

ABDOMEN:  Soft.

EXTREMITIES:  No edema.  Status post bilateral TMA.



CARDIAC MEDICATIONS

1. Furosemide 20 mg p.o. b.i.d.

2. Metoprolol succinate 75 mg p.o. daily.

3. Aspirin 81 mg p.o. daily.



LABS:  None today.



TELEMETRY:  V-paced. 



IMPRESSION 

1. Escherichia coli bacteremia.

2. Proteus mirabilis urinary tract infection.

3. Sepsis secondary to above.

4. Atypical chest pain.

5. Peripheral arterial disease, status post multiple interventions.

6. Permanent atrial fibrillation.

7. Recent transmetatarsal amputation bilaterally.



RECOMMENDATIONS:  Continue current cardiac medications.  Stress test was 

without evidence of ischemia, although he did have an old nontransmural 

infarct in the inferolateral wall.  No further cardiac evaluation is 

indicated at this time.  IV antibiotics per infectious disease.



Thank you for this consult.  We will continue to follow. 









DD:  04/03/2018 17:33

DT:  04/03/2018 19:49

Job#:  [id number] KARAN

## 2018-04-04 VITALS — SYSTOLIC BLOOD PRESSURE: 109 MMHG | DIASTOLIC BLOOD PRESSURE: 58 MMHG

## 2018-04-04 VITALS — SYSTOLIC BLOOD PRESSURE: 122 MMHG | DIASTOLIC BLOOD PRESSURE: 56 MMHG

## 2018-04-04 VITALS — DIASTOLIC BLOOD PRESSURE: 54 MMHG | SYSTOLIC BLOOD PRESSURE: 112 MMHG

## 2018-04-04 VITALS — SYSTOLIC BLOOD PRESSURE: 118 MMHG | DIASTOLIC BLOOD PRESSURE: 64 MMHG

## 2018-04-04 VITALS — DIASTOLIC BLOOD PRESSURE: 56 MMHG | SYSTOLIC BLOOD PRESSURE: 122 MMHG

## 2018-04-04 VITALS — DIASTOLIC BLOOD PRESSURE: 57 MMHG | SYSTOLIC BLOOD PRESSURE: 124 MMHG

## 2018-04-04 LAB
ALBUMIN SERPL-MCNC: 2.3 G/DL (ref 3.5–5)
ALBUMIN/GLOB SERPL: 0.6 {RATIO} (ref 0.8–2)
ALP SERPL-CCNC: 216 IU/L (ref 40–150)
ALT SERPL-CCNC: 26 IU/L (ref 0–55)
ANION GAP SERPL CALC-SCNC: 9.7 MMOL/L (ref 8–16)
BASOPHILS # BLD AUTO: 0.1 10*3/UL (ref 0–0.1)
BASOPHILS NFR BLD AUTO: 1 % (ref 0–1)
BUN SERPL-MCNC: 20 MG/DL (ref 7–26)
BUN/CREAT SERPL: 25 (ref 6–25)
CALCIUM SERPL-MCNC: 8.6 MG/DL (ref 8.4–10.2)
CHLORIDE SERPL-SCNC: 101 MMOL/L (ref 98–107)
CO2 SERPL-SCNC: 31 MMOL/L (ref 22–29)
DEPRECATED NEUTROPHILS # BLD AUTO: 4 10*3/UL (ref 2.1–6.9)
EGFRCR SERPLBLD CKD-EPI 2021: > 60 ML/MIN (ref 60–?)
EOSINOPHIL # BLD AUTO: 0.3 10*3/UL (ref 0–0.4)
EOSINOPHIL NFR BLD AUTO: 5.4 % (ref 0–6)
ERYTHROCYTE [DISTWIDTH] IN CORD BLOOD: 16.8 % (ref 11.7–14.4)
GLOBULIN PLAS-MCNC: 3.7 G/DL (ref 2.3–3.5)
GLUCOSE SERPLBLD-MCNC: 92 MG/DL (ref 74–118)
HCT VFR BLD AUTO: 44.5 % (ref 38.2–49.6)
HGB BLD-MCNC: 14.8 G/DL (ref 14–18)
LYMPHOCYTES # BLD: 0.9 10*3/UL (ref 1–3.2)
LYMPHOCYTES NFR BLD AUTO: 15.5 % (ref 18–39.1)
MAGNESIUM SERPL-MCNC: 1.9 MG/DL (ref 1.3–2.1)
MCH RBC QN AUTO: 30.4 PG (ref 28–32)
MCHC RBC AUTO-ENTMCNC: 33.3 G/DL (ref 31–35)
MCV RBC AUTO: 91.4 FL (ref 81–99)
MONOCYTES # BLD AUTO: 0.6 10*3/UL (ref 0.2–0.8)
MONOCYTES NFR BLD AUTO: 9.4 % (ref 4.4–11.3)
NEUTS SEG NFR BLD AUTO: 67.9 % (ref 38.7–80)
PLATELET # BLD AUTO: 314 X10E3/UL (ref 140–360)
POTASSIUM SERPL-SCNC: 3.7 MMOL/L (ref 3.5–5.1)
RBC # BLD AUTO: 4.87 X10E6/UL (ref 4.3–5.7)
SODIUM SERPL-SCNC: 138 MMOL/L (ref 136–145)

## 2018-04-04 RX ADMIN — PANTOPRAZOLE SODIUM SCH MG: 40 TABLET, DELAYED RELEASE ORAL at 09:10

## 2018-04-04 RX ADMIN — MEROPENEM SCH GM: 1 INJECTION INTRAVENOUS at 14:20

## 2018-04-04 RX ADMIN — ASPIRIN 81 MG CHEWABLE TABLET SCH MG: 81 TABLET CHEWABLE at 09:10

## 2018-04-04 RX ADMIN — TAMSULOSIN HYDROCHLORIDE SCH MG: 0.4 CAPSULE ORAL at 09:10

## 2018-04-04 RX ADMIN — Medication SCH MG: at 09:10

## 2018-04-04 RX ADMIN — Medication SCH MG: at 14:45

## 2018-04-04 RX ADMIN — MEROPENEM SCH GM: 1 INJECTION INTRAVENOUS at 09:10

## 2018-04-04 RX ADMIN — MEMANTINE HYDROCHLORIDE SCH MG: 10 TABLET ORAL at 09:10

## 2018-04-04 RX ADMIN — METOPROLOL SUCCINATE SCH MG: 25 TABLET, EXTENDED RELEASE ORAL at 09:12

## 2018-04-04 RX ADMIN — INSULIN HUMAN SCH UNIT: 100 INJECTION, SOLUTION PARENTERAL at 07:30

## 2018-04-04 RX ADMIN — FUROSEMIDE SCH MG: 20 TABLET ORAL at 09:10

## 2018-04-04 RX ADMIN — MEROPENEM SCH GM: 1 INJECTION INTRAVENOUS at 01:34

## 2018-04-04 RX ADMIN — INSULIN HUMAN SCH UNIT: 100 INJECTION, SOLUTION PARENTERAL at 11:30

## 2018-04-04 RX ADMIN — POTASSIUM CHLORIDE SCH MEQ: 1500 TABLET, EXTENDED RELEASE ORAL at 09:12

## 2018-04-04 NOTE — PROGRESS NOTE
DATE:  April 04, 2018 



CARDIOLOGY PROGRESS NOTE  



SUBJECTIVE:  The patient denies chest pain or shortness of breath.  He is 

being discharged today. 



OBJECTIVE  

VITAL SIGNS:  Temperature 96.7 degrees, pulse 88, respiratory rate 20, 

blood pressure 112/54, oxygen saturation 96% on room air. 

GENERAL:  Awake, alert, in no acute distress.

LUNGS:  Clear to auscultation bilaterally.  No wheezes or crackles. 

CARDIOVASCULAR:  Normal rate, regular rhythm.  Systolic murmur 1/6.  Normal 

S1, S2.  

ABDOMEN:  Soft.

EXTREMITIES:  No edema.  Status post bilateral TMA.



CARDIAC MEDICATIONS

1. Furosemide 20 mg p.o. b.i.d.

2. Metoprolol succinate 75 mg p.o. daily.

3. Aspirin 81 mg p.o. daily.

4. Rivaroxaban 20 mg p.o. daily. 



LABS:  WBC 5.94, hemoglobin 14.8, hematocrit 44.5, platelets 314,000, 

sodium 138, potassium 3.7, chloride 101, CO2 of 31, BUN 20, creatinine 0.8. 

 WBC 5.94.  Hemoglobin 14.8, hematocrit 44.5, platelets 314,000.  Modified 

barium swallow.  Functional swallow, no aspiration or penetration.  



TELEMETRY:  V-paced. 



IMPRESSION 

1. Escherichia coli bacteremia.

2. Proteus mirabilis urinary tract infection.

3. Sepsis secondary to above.

4. Atypical chest pain.

5. Peripheral arterial disease, status post multiple interventions.

6. Permanent atrial fibrillation.

7. Recent transmetatarsal amputation bilaterally.



RECOMMENDATIONS:  Continue current cardiac medications.  Stress test 

without evidence of ischemia, although he did have an old nontransmural 

infarct in the inferolateral wall.  No further cardiac evaluation is 

indicated at this time.  IV antibiotics per infectious disease.  



Thank you for this consult.  We will continue to follow. 







DD:  04/04/2018 17:50

DT:  04/04/2018 18:01

Job#:  [id number]

## 2018-04-04 NOTE — DIAGNOSTIC IMAGING REPORT
PROCEDURE:X-RAY MODIFIED BARIUM SWALLOW

 

COMPARISON:None.

 

INDICATIONS:Dysphagia

 

DISCUSSION:Fluoroscopic examination was performed in conjunction with 

speech pathology, during swallowing of a variety of thin and thick 

liquid consistencies.

As examination shows  no premature spillage into piriform sinus.

No laryngeal penetration or aspiration was noted.

Trace vallecular residue was seen. No piriform sinus, pharyngeal wall 

or base of tongue residue is noted.

 

 

CONCLUSION:Functional swallow. No penetration or aspiration.  Please 

see the report from speech pathology for complete details.

 

 

 

J Cralos Nguyen M.D.  

Dictated by:  J Carlos Nguyen M.D. on 4/04/2018 at 16:03     

Electronically approved by:  J Carlos Nguyen M.D. on 4/04/2018 at 

16:03

## 2018-07-08 ENCOUNTER — HOSPITAL ENCOUNTER (EMERGENCY)
Dept: HOSPITAL 88 - ER | Age: 83
Discharge: HOME | End: 2018-07-08
Payer: MEDICARE

## 2018-07-08 VITALS — SYSTOLIC BLOOD PRESSURE: 144 MMHG | DIASTOLIC BLOOD PRESSURE: 69 MMHG

## 2018-07-08 VITALS — WEIGHT: 185 LBS | BODY MASS INDEX: 27.4 KG/M2 | HEIGHT: 69 IN

## 2018-07-08 DIAGNOSIS — N30.21: ICD-10-CM

## 2018-07-08 DIAGNOSIS — R30.0: Primary | ICD-10-CM

## 2018-07-08 LAB
ANION GAP SERPL CALC-SCNC: 13.6 MMOL/L (ref 8–16)
BACTERIA URNS QL MICRO: (no result) /HPF
BACTERIA URNS QL MICRO: (no result) /HPF
BASOPHILS # BLD AUTO: 0 10*3/UL (ref 0–0.1)
BASOPHILS NFR BLD AUTO: 0.4 % (ref 0–1)
BILIRUB UR QL: NEGATIVE
BILIRUB UR QL: NEGATIVE
BUN SERPL-MCNC: 19 MG/DL (ref 7–26)
BUN/CREAT SERPL: 21 (ref 6–25)
CALCIUM SERPL-MCNC: 9 MG/DL (ref 8.4–10.2)
CHLORIDE SERPL-SCNC: 102 MMOL/L (ref 98–107)
CLARITY UR: (no result)
CLARITY UR: (no result)
CO2 SERPL-SCNC: 30 MMOL/L (ref 22–29)
COLOR UR: YELLOW
COLOR UR: YELLOW
DEPRECATED NEUTROPHILS # BLD AUTO: 9.4 10*3/UL (ref 2.1–6.9)
DEPRECATED RBC URNS MANUAL-ACNC: (no result) /HPF (ref 0–5)
DEPRECATED RBC URNS MANUAL-ACNC: >50 /HPF (ref 0–5)
EGFRCR SERPLBLD CKD-EPI 2021: > 60 ML/MIN (ref 60–?)
EOSINOPHIL # BLD AUTO: 0.2 10*3/UL (ref 0–0.4)
EOSINOPHIL NFR BLD AUTO: 1.4 % (ref 0–6)
EPI CELLS URNS QL MICRO: (no result) /LPF
EPI CELLS URNS QL MICRO: (no result) /LPF
ERYTHROCYTE [DISTWIDTH] IN CORD BLOOD: 18 % (ref 11.7–14.4)
GLUCOSE SERPLBLD-MCNC: 98 MG/DL (ref 74–118)
HCT VFR BLD AUTO: 47.4 % (ref 38.2–49.6)
HGB BLD-MCNC: 15.9 G/DL (ref 14–18)
KETONES UR QL STRIP.AUTO: NEGATIVE
KETONES UR QL STRIP.AUTO: NEGATIVE
LEUKOCYTE ESTERASE UR QL STRIP.AUTO: (no result)
LEUKOCYTE ESTERASE UR QL STRIP.AUTO: (no result)
LYMPHOCYTES # BLD: 0.8 10*3/UL (ref 1–3.2)
LYMPHOCYTES NFR BLD AUTO: 7.5 % (ref 18–39.1)
MCH RBC QN AUTO: 30.7 PG (ref 28–32)
MCHC RBC AUTO-ENTMCNC: 33.5 G/DL (ref 31–35)
MCV RBC AUTO: 91.5 FL (ref 81–99)
MONOCYTES # BLD AUTO: 0.7 10*3/UL (ref 0.2–0.8)
MONOCYTES NFR BLD AUTO: 5.8 % (ref 4.4–11.3)
MUCOUS THREADS URNS QL MICRO: (no result)
NEUTS SEG NFR BLD AUTO: 84.4 % (ref 38.7–80)
NITRITE UR QL STRIP.AUTO: NEGATIVE
NITRITE UR QL STRIP.AUTO: POSITIVE
PLATELET # BLD AUTO: 224 X10E3/UL (ref 140–360)
POTASSIUM SERPL-SCNC: 3.6 MMOL/L (ref 3.5–5.1)
PROT UR QL STRIP.AUTO: NEGATIVE
PROT UR QL STRIP.AUTO: NEGATIVE
RBC # BLD AUTO: 5.18 X10E6/UL (ref 4.3–5.7)
SODIUM SERPL-SCNC: 142 MMOL/L (ref 136–145)
SP GR UR STRIP: 1.01 (ref 1.01–1.02)
SP GR UR STRIP: 1.01 (ref 1.01–1.02)
UROBILINOGEN UR STRIP-MCNC: 1 MG/DL (ref 0.2–1)
UROBILINOGEN UR STRIP-MCNC: 4 MG/DL (ref 0.2–1)

## 2018-07-08 PROCEDURE — 51700 IRRIGATION OF BLADDER: CPT

## 2018-07-08 PROCEDURE — 36415 COLL VENOUS BLD VENIPUNCTURE: CPT

## 2018-07-08 PROCEDURE — 85025 COMPLETE CBC W/AUTO DIFF WBC: CPT

## 2018-07-08 PROCEDURE — 81001 URINALYSIS AUTO W/SCOPE: CPT

## 2018-07-08 PROCEDURE — 87186 SC STD MICRODIL/AGAR DIL: CPT

## 2018-07-08 PROCEDURE — 87086 URINE CULTURE/COLONY COUNT: CPT

## 2018-07-08 PROCEDURE — 99284 EMERGENCY DEPT VISIT MOD MDM: CPT

## 2018-07-08 PROCEDURE — 51702 INSERT TEMP BLADDER CATH: CPT

## 2018-07-08 PROCEDURE — 80048 BASIC METABOLIC PNL TOTAL CA: CPT

## 2018-08-18 ENCOUNTER — HOSPITAL ENCOUNTER (INPATIENT)
Dept: HOSPITAL 88 - ER | Age: 83
LOS: 5 days | Discharge: ANOTHER HOSPITAL | DRG: 271 | End: 2018-08-23
Attending: INTERNAL MEDICINE | Admitting: INTERNAL MEDICINE
Payer: MEDICARE

## 2018-08-18 VITALS — DIASTOLIC BLOOD PRESSURE: 77 MMHG | SYSTOLIC BLOOD PRESSURE: 160 MMHG

## 2018-08-18 VITALS — BODY MASS INDEX: 28.14 KG/M2 | HEIGHT: 69 IN | WEIGHT: 190 LBS

## 2018-08-18 VITALS — SYSTOLIC BLOOD PRESSURE: 159 MMHG | DIASTOLIC BLOOD PRESSURE: 72 MMHG

## 2018-08-18 VITALS — DIASTOLIC BLOOD PRESSURE: 78 MMHG | SYSTOLIC BLOOD PRESSURE: 153 MMHG

## 2018-08-18 VITALS — DIASTOLIC BLOOD PRESSURE: 75 MMHG | SYSTOLIC BLOOD PRESSURE: 161 MMHG

## 2018-08-18 VITALS — DIASTOLIC BLOOD PRESSURE: 65 MMHG | SYSTOLIC BLOOD PRESSURE: 141 MMHG

## 2018-08-18 DIAGNOSIS — Z79.01: ICD-10-CM

## 2018-08-18 DIAGNOSIS — Z89.431: ICD-10-CM

## 2018-08-18 DIAGNOSIS — I70.293: ICD-10-CM

## 2018-08-18 DIAGNOSIS — Z79.4: ICD-10-CM

## 2018-08-18 DIAGNOSIS — I73.9: ICD-10-CM

## 2018-08-18 DIAGNOSIS — Z89.432: ICD-10-CM

## 2018-08-18 DIAGNOSIS — K74.60: ICD-10-CM

## 2018-08-18 DIAGNOSIS — R41.841: ICD-10-CM

## 2018-08-18 DIAGNOSIS — G62.9: ICD-10-CM

## 2018-08-18 DIAGNOSIS — I10: ICD-10-CM

## 2018-08-18 DIAGNOSIS — E11.51: Primary | ICD-10-CM

## 2018-08-18 DIAGNOSIS — I48.2: ICD-10-CM

## 2018-08-18 DIAGNOSIS — I72.4: ICD-10-CM

## 2018-08-18 DIAGNOSIS — G89.4: ICD-10-CM

## 2018-08-18 DIAGNOSIS — F05: ICD-10-CM

## 2018-08-18 DIAGNOSIS — I99.8: ICD-10-CM

## 2018-08-18 DIAGNOSIS — F03.90: ICD-10-CM

## 2018-08-18 LAB
ALBUMIN SERPL-MCNC: 2.8 G/DL (ref 3.5–5)
ALBUMIN/GLOB SERPL: 0.7 {RATIO} (ref 0.8–2)
ALP SERPL-CCNC: 234 IU/L (ref 40–150)
ALT SERPL-CCNC: 22 IU/L (ref 0–55)
ANION GAP SERPL CALC-SCNC: 15 MMOL/L (ref 8–16)
BASOPHILS # BLD AUTO: 0 10*3/UL (ref 0–0.1)
BASOPHILS NFR BLD AUTO: 0.7 % (ref 0–1)
BUN SERPL-MCNC: 19 MG/DL (ref 7–26)
BUN/CREAT SERPL: 21 (ref 6–25)
CALCIUM SERPL-MCNC: 8.8 MG/DL (ref 8.4–10.2)
CHLORIDE SERPL-SCNC: 99 MMOL/L (ref 98–107)
CO2 SERPL-SCNC: 27 MMOL/L (ref 22–29)
DEPRECATED APTT PLAS QN: 29.5 SECONDS (ref 23.8–35.5)
DEPRECATED INR PLAS: 1.18
DEPRECATED NEUTROPHILS # BLD AUTO: 3.6 10*3/UL (ref 2.1–6.9)
EGFRCR SERPLBLD CKD-EPI 2021: > 60 ML/MIN (ref 60–?)
EOSINOPHIL # BLD AUTO: 0.4 10*3/UL (ref 0–0.4)
EOSINOPHIL NFR BLD AUTO: 6.5 % (ref 0–6)
ERYTHROCYTE [DISTWIDTH] IN CORD BLOOD: 16.2 % (ref 11.7–14.4)
GLOBULIN PLAS-MCNC: 4.1 G/DL (ref 2.3–3.5)
GLUCOSE SERPLBLD-MCNC: 102 MG/DL (ref 74–118)
HCT VFR BLD AUTO: 46.7 % (ref 38.2–49.6)
HGB BLD-MCNC: 15.5 G/DL (ref 14–18)
LYMPHOCYTES # BLD: 1.2 10*3/UL (ref 1–3.2)
LYMPHOCYTES NFR BLD AUTO: 20.6 % (ref 18–39.1)
MCH RBC QN AUTO: 31 PG (ref 28–32)
MCHC RBC AUTO-ENTMCNC: 33.2 G/DL (ref 31–35)
MCV RBC AUTO: 93.4 FL (ref 81–99)
MONOCYTES # BLD AUTO: 0.6 10*3/UL (ref 0.2–0.8)
MONOCYTES NFR BLD AUTO: 9.9 % (ref 4.4–11.3)
NEUTS SEG NFR BLD AUTO: 62 % (ref 38.7–80)
PLATELET # BLD AUTO: 203 X10E3/UL (ref 140–360)
POTASSIUM SERPL-SCNC: 3 MMOL/L (ref 3.5–5.1)
PROTHROMBIN TIME: 14.1 SECONDS (ref 11.9–14.5)
RBC # BLD AUTO: 5 X10E6/UL (ref 4.3–5.7)
SODIUM SERPL-SCNC: 138 MMOL/L (ref 136–145)

## 2018-08-18 PROCEDURE — 83605 ASSAY OF LACTIC ACID: CPT

## 2018-08-18 PROCEDURE — 75625 CONTRAST EXAM ABDOMINL AORTA: CPT

## 2018-08-18 PROCEDURE — 93925 LOWER EXTREMITY STUDY: CPT

## 2018-08-18 PROCEDURE — 75716 ARTERY X-RAYS ARMS/LEGS: CPT

## 2018-08-18 PROCEDURE — 80061 LIPID PANEL: CPT

## 2018-08-18 PROCEDURE — 85730 THROMBOPLASTIN TIME PARTIAL: CPT

## 2018-08-18 PROCEDURE — 75710 ARTERY X-RAYS ARM/LEG: CPT

## 2018-08-18 PROCEDURE — 36247 INS CATH ABD/L-EXT ART 3RD: CPT

## 2018-08-18 PROCEDURE — 36415 COLL VENOUS BLD VENIPUNCTURE: CPT

## 2018-08-18 PROCEDURE — 87205 SMEAR GRAM STAIN: CPT

## 2018-08-18 PROCEDURE — 87040 BLOOD CULTURE FOR BACTERIA: CPT

## 2018-08-18 PROCEDURE — 80048 BASIC METABOLIC PNL TOTAL CA: CPT

## 2018-08-18 PROCEDURE — 93971 EXTREMITY STUDY: CPT

## 2018-08-18 PROCEDURE — 80053 COMPREHEN METABOLIC PANEL: CPT

## 2018-08-18 PROCEDURE — 87071 CULTURE AEROBIC QUANT OTHER: CPT

## 2018-08-18 PROCEDURE — 99285 EMERGENCY DEPT VISIT HI MDM: CPT

## 2018-08-18 PROCEDURE — 85610 PROTHROMBIN TIME: CPT

## 2018-08-18 PROCEDURE — 37211 THROMBOLYTIC ART THERAPY: CPT

## 2018-08-18 PROCEDURE — 85025 COMPLETE CBC W/AUTO DIFF WBC: CPT

## 2018-08-18 PROCEDURE — 93005 ELECTROCARDIOGRAM TRACING: CPT

## 2018-08-18 PROCEDURE — 82948 REAGENT STRIP/BLOOD GLUCOSE: CPT

## 2018-08-18 RX ADMIN — SODIUM CHLORIDE SCH MLS/HR: 9 INJECTION, SOLUTION INTRAVENOUS at 13:02

## 2018-08-18 RX ADMIN — LATANOPROST SCH ML: 50 SOLUTION OPHTHALMIC at 21:00

## 2018-08-18 RX ADMIN — CLONAZEPAM PRN MG: 0.5 TABLET ORAL at 17:38

## 2018-08-18 RX ADMIN — INSULIN HUMAN SCH UNIT: 100 INJECTION, SOLUTION PARENTERAL at 07:30

## 2018-08-18 RX ADMIN — INSULIN HUMAN SCH UNIT: 100 INJECTION, SOLUTION PARENTERAL at 11:30

## 2018-08-18 RX ADMIN — SODIUM CHLORIDE SCH MLS/HR: 9 INJECTION, SOLUTION INTRAVENOUS at 23:20

## 2018-08-18 RX ADMIN — Medication SCH MG: at 15:00

## 2018-08-18 RX ADMIN — MEMANTINE HYDROCHLORIDE SCH MG: 10 TABLET ORAL at 17:00

## 2018-08-18 RX ADMIN — FUROSEMIDE SCH MG: 20 TABLET ORAL at 17:00

## 2018-08-18 RX ADMIN — INSULIN HUMAN SCH UNIT: 100 INJECTION, SOLUTION PARENTERAL at 21:00

## 2018-08-18 RX ADMIN — SODIUM CHLORIDE SCH MLS/HR: 900 INJECTION, SOLUTION INTRAVENOUS at 06:06

## 2018-08-18 RX ADMIN — Medication SCH MG: at 21:58

## 2018-08-18 RX ADMIN — INSULIN HUMAN SCH UNIT: 100 INJECTION, SOLUTION PARENTERAL at 17:45

## 2018-08-18 NOTE — HISTORY AND PHYSICAL
CHIEF COMPLAINT:  Right foot pain with cold sensation.



HISTORY OF PRESENT ILLNESS:  This is an 84-year-old  male who has 

Alzheimer's dementia comes in with severe PAD with bilateral 

transmetatarsal amputation in both feet, hypertension and BPH as well as 

anxiety who presents to the ED with complaints of right foot pain and cold 

sensation ongoing for the last several days.  Patient is a very poor 

historian and so information is being obtained from the ED note as well as 

the staff.  There are no family members currently at bedside.  According to 

the records, patient has had several lower extremity angiograms requiring 

intervention.  He was last seen by Dr. Moore, cardiology.  Patient was 

seen and evaluated at bedside in the ER, currently doing well with no other 

issues.  Currently on heparin drip.  His right foot has some cold sensation 

on examination.  Does have some pain.



REVIEW OF SYSTEMS:  Right foot pain with cold sensation.  Unable to obtain 

the rest of 14- point review of systems as the patient currently has some 

dementia.  



ALLERGIES:  NO KNOWN DRUG ALLERGIES.



HOME MEDICATIONS:  Aspirin 81 mg daily, Klonopin 0.5 mg p.o. t.i.d., 

finasteride 5 mg daily, furosemide 20 mg b.i.d., gabapentin 100 mg p.o. 

t.i.d., Norco 10 every 6 hours as needed for pain, Namenda 5 mg p.o. 

b.i.d., metoprolol tartrate 25 mg daily, multivitamin, Protonix 40 mg 

daily, tamsulosin 0.4 mg 1 tab daily.  He also take Xarelto 1 tab daily.



PAST MEDICAL HISTORY:  Has a history of AFib on anticoagulation, peripheral 

vascular disease, bilateral TMAs.  He also had multiple lower extremity 

angiograms.  He has dementia.  He has BPH.  He has peripheral neuropathy.



SURGICAL HISTORY:  Bilateral TMA.  He has had multiple angiograms in the 

past. 



FAMILY HISTORY:  Hypertension and diabetes.  



SOCIAL HISTORY:  No drugs, no alcohol and does not smoke.  Currently lives 

with family.  No family at bedside at this time.



VITAL SIGNS:  Surgeon 98 pulse 80 respiratory rate is 18 blood pressure 125 

760.  Pulse ox 98% on room air.



LABORATORY DATA:  Lab findings show white count 5.8, hemoglobin 15, 

hematocrit 47, platelets 203,000.  Coagulation:  PT 14, INR 1.1, PTT 29.5.  

Chemistry:  Sodium 138, potassium is 3, chloride 99, bicarb 27, anion gap 

of 15.  BUN is 19, creatinine is 0.9.  Glucose is 102.  Calcium is 8.8.  

LFTs were normal.  Total protein 6.9, albumin 2.8.



MICROBIOLOGY:  Blood cultures are pending.



IMAGING STUDIES:  Venous lower extremity Doppler are all pending.



PHYSICAL EXAM:   

VITAL SIGNS:  Temperature 98, pulse 80, respiratory rate 18, blood pressure 

145/76, pulse oximetry 98% on room air.  

GENERAL:  Not in acute distress.  He is a very poor historian.  He has some 

underlying dementia.   

HEENT:  Head is normocephalic, atraumatic.  Eyes:  Pupils equal, round and 

reactive to light bilaterally.  Extraocular movements intact bilaterally.  

Throat with no evidence of erythema or exudates in the posterior pharynx.  

He has poor dentition. 

NECK:  Supple with good range of motion. 

PULMONARY:  Clear to auscultation bilaterally.  No wheezing, no rales, no 

rhonchi, no crackles appreciated. 

CARDIOVASCULAR:  Positive S1 and S2, no murmurs, rubs or gallops 

appreciated. 

ABDOMEN:  Soft, nondistended, nontender on palpation.  Bowel sounds were 

present.

MUSCULOSKELETAL:  Strength 5/5 throughout, no evidence of musculoskeletal 

deficit on exam.  No weakness appreciated.  

NEUROLOGIC:  Cranial nerves II-XII grossly intact.  No evidence of 

neurological deficit on examination. 

SKIN:  Intact.  Warm to touch.  Good capillary refill. 

EXTREMITIES:   He has a right lower extremity, foot, erythema, cold 

sensation.  Pulses are minimally palpable and kind of bluish and cyanotic 

in discoloration.  

PSYCHIATRIC:  He has baseline dementia.    

 

IMPRESSION 

1. Right lower extremity acute ischemic limb.

2. Dementia.  

3. Hypertension.

4. Peripheral neuropathy.

5. Chronic pain.  



PLAN: At this time, cardiology has been consulted, Dr. Moore.  Will likely 

need any angiogram of the right lower extremity.  Will continue with a 

heparin drip for now.  We are going to resume all of his antihypertensive 

medications as well as his anxiolytics that he has been taking for 

significant period of time.  Put him on low dose IV fluids.  Will continue 

n.p.o. for now.  It is less likely there will be any kind of procedure 

during this time and likely will occur maybe on Monday.  We are going to 

have cardiology notified and ask, but likely will occur on Monday.  We are 

going to resume all home medications.  He has heparin for DVT prophylaxis 

and for GI prophylaxis he has Protonix.  Patient will be admitted for 

further evaluation by cardiology.  





DD:  08/18/2018 09:40

DT:  08/18/2018 09:49

Job#:  [id number] ROMY

## 2018-08-18 NOTE — CONSULTATION
DATE OF CONSULTATION:  August 18, 2018 



INDICATIONS:  Critical limb ischemia.



HISTORY OF PRESENT ILLNESS:  Mr. Lozada is 84 years old.  He has atrial 

fibrillation.  He has a biventricular pacemaker and AV node ablation.  He 

has severe peripheral arterial disease.  Recent intervention was done 

within the last 6 months; however, he now presents with new onset of 

coolness in his right foot consistent with critical limb ischemia.  

Arterial Duplexes consistent with severe re-stenosis in his right and left 

femoral arteries.  There is no evidence of DVT.



REVIEW OF SYSTEMS:  Is negative except as dictated in the History of 

Present Illness.  



PAST MEDICAL HISTORY:  Diabetes mellitus and other conditions as listed 

above.



SOCIAL HISTORY:  Patient lives in a nursing home.  Does not smoke or drink.



MEDICATIONS:  Reviewed.



REVIEW OF SYSTEMS:  As dictated in the history of present illness physical 

examination. 

 

PHYSICAL EXAMINATION:  

VITALS:  Afebrile, heart rate is 80.  Blood pressure 145/76. 

CARDIOVASCULAR:  Regular rhythm.  Systolic murmur.  No pulses in the feet. 

LUNGS:  Clear to auscultation bilaterally. 



Arterial Duplex was reviewed.  Hemoglobin is 15.5, serum creatinine is 

normal. 



ASSESSMENT:   Critical limb ischemia of the right lower extremity with 

severe peripheral arterial disease.



RECOMMENDATIONS:  I agree with intravenous heparin for now.  We will plan 

for angiography and intervention as indicated.  



I thank Dr. Alvarado for this consultation.  



 





DD:  08/18/2018 13:00

DT:  08/18/2018 13:17

Job#:  [id number]

## 2018-08-19 VITALS — SYSTOLIC BLOOD PRESSURE: 158 MMHG | DIASTOLIC BLOOD PRESSURE: 70 MMHG

## 2018-08-19 VITALS — SYSTOLIC BLOOD PRESSURE: 116 MMHG | DIASTOLIC BLOOD PRESSURE: 66 MMHG

## 2018-08-19 VITALS — SYSTOLIC BLOOD PRESSURE: 137 MMHG | DIASTOLIC BLOOD PRESSURE: 77 MMHG

## 2018-08-19 VITALS — DIASTOLIC BLOOD PRESSURE: 62 MMHG | SYSTOLIC BLOOD PRESSURE: 117 MMHG

## 2018-08-19 VITALS — DIASTOLIC BLOOD PRESSURE: 69 MMHG | SYSTOLIC BLOOD PRESSURE: 149 MMHG

## 2018-08-19 VITALS — DIASTOLIC BLOOD PRESSURE: 72 MMHG | SYSTOLIC BLOOD PRESSURE: 135 MMHG

## 2018-08-19 LAB
ANION GAP SERPL CALC-SCNC: 12.9 MMOL/L (ref 8–16)
BASOPHILS # BLD AUTO: 0 10*3/UL (ref 0–0.1)
BASOPHILS NFR BLD AUTO: 0.8 % (ref 0–1)
BUN SERPL-MCNC: 16 MG/DL (ref 7–26)
BUN/CREAT SERPL: 21 (ref 6–25)
CALCIUM SERPL-MCNC: 8.6 MG/DL (ref 8.4–10.2)
CHLORIDE SERPL-SCNC: 105 MMOL/L (ref 98–107)
CO2 SERPL-SCNC: 25 MMOL/L (ref 22–29)
DEPRECATED NEUTROPHILS # BLD AUTO: 3.2 10*3/UL (ref 2.1–6.9)
EGFRCR SERPLBLD CKD-EPI 2021: > 60 ML/MIN (ref 60–?)
EOSINOPHIL # BLD AUTO: 0.3 10*3/UL (ref 0–0.4)
EOSINOPHIL NFR BLD AUTO: 5.6 % (ref 0–6)
ERYTHROCYTE [DISTWIDTH] IN CORD BLOOD: 15.9 % (ref 11.7–14.4)
GLUCOSE SERPLBLD-MCNC: 94 MG/DL (ref 74–118)
HCT VFR BLD AUTO: 45.8 % (ref 38.2–49.6)
HGB BLD-MCNC: 15.2 G/DL (ref 14–18)
LYMPHOCYTES # BLD: 1.1 10*3/UL (ref 1–3.2)
LYMPHOCYTES NFR BLD AUTO: 21 % (ref 18–39.1)
MCH RBC QN AUTO: 30.6 PG (ref 28–32)
MCHC RBC AUTO-ENTMCNC: 33.2 G/DL (ref 31–35)
MCV RBC AUTO: 92.3 FL (ref 81–99)
MONOCYTES # BLD AUTO: 0.4 10*3/UL (ref 0.2–0.8)
MONOCYTES NFR BLD AUTO: 8.8 % (ref 4.4–11.3)
NEUTS SEG NFR BLD AUTO: 63.4 % (ref 38.7–80)
PLATELET # BLD AUTO: 206 X10E3/UL (ref 140–360)
POTASSIUM SERPL-SCNC: 2.9 MMOL/L (ref 3.5–5.1)
RBC # BLD AUTO: 4.96 X10E6/UL (ref 4.3–5.7)
SODIUM SERPL-SCNC: 140 MMOL/L (ref 136–145)

## 2018-08-19 RX ADMIN — MEMANTINE HYDROCHLORIDE SCH MG: 10 TABLET ORAL at 09:00

## 2018-08-19 RX ADMIN — QUETIAPINE PRN MG: 25 TABLET, FILM COATED ORAL at 10:13

## 2018-08-19 RX ADMIN — CLONAZEPAM PRN MG: 0.5 TABLET ORAL at 02:32

## 2018-08-19 RX ADMIN — CLONAZEPAM PRN MG: 0.5 TABLET ORAL at 13:55

## 2018-08-19 RX ADMIN — FUROSEMIDE SCH MG: 20 TABLET ORAL at 17:00

## 2018-08-19 RX ADMIN — TAMSULOSIN HYDROCHLORIDE SCH MG: 0.4 CAPSULE ORAL at 09:00

## 2018-08-19 RX ADMIN — INSULIN HUMAN SCH UNIT: 100 INJECTION, SOLUTION PARENTERAL at 07:30

## 2018-08-19 RX ADMIN — HYDROCODONE BITARTRATE AND ACETAMINOPHEN PRN EA: 5; 325 TABLET ORAL at 02:32

## 2018-08-19 RX ADMIN — INSULIN HUMAN SCH UNIT: 100 INJECTION, SOLUTION PARENTERAL at 11:30

## 2018-08-19 RX ADMIN — SODIUM CHLORIDE SCH MLS/HR: 9 INJECTION, SOLUTION INTRAVENOUS at 12:40

## 2018-08-19 RX ADMIN — METOPROLOL TARTRATE SCH MG: 25 TABLET, FILM COATED ORAL at 09:00

## 2018-08-19 RX ADMIN — HYDROCODONE BITARTRATE AND ACETAMINOPHEN PRN EA: 5; 325 TABLET ORAL at 10:45

## 2018-08-19 RX ADMIN — SODIUM CHLORIDE SCH MLS/HR: 9 INJECTION, SOLUTION INTRAVENOUS at 13:49

## 2018-08-19 RX ADMIN — Medication SCH MG: at 15:00

## 2018-08-19 RX ADMIN — INSULIN HUMAN SCH UNIT: 100 INJECTION, SOLUTION PARENTERAL at 16:30

## 2018-08-19 RX ADMIN — SODIUM CHLORIDE SCH MLS/HR: 900 INJECTION, SOLUTION INTRAVENOUS at 05:45

## 2018-08-19 RX ADMIN — SODIUM CHLORIDE SCH MLS/HR: 9 INJECTION, SOLUTION INTRAVENOUS at 21:49

## 2018-08-19 RX ADMIN — MEMANTINE HYDROCHLORIDE SCH MG: 10 TABLET ORAL at 17:00

## 2018-08-19 RX ADMIN — Medication SCH MG: at 09:00

## 2018-08-19 RX ADMIN — INSULIN HUMAN SCH UNIT: 100 INJECTION, SOLUTION PARENTERAL at 21:00

## 2018-08-19 RX ADMIN — PANTOPRAZOLE SODIUM SCH MG: 40 TABLET, DELAYED RELEASE ORAL at 09:00

## 2018-08-19 RX ADMIN — ASPIRIN 81 MG CHEWABLE TABLET SCH MG: 81 TABLET CHEWABLE at 08:45

## 2018-08-19 RX ADMIN — Medication SCH MG: at 21:34

## 2018-08-19 RX ADMIN — FUROSEMIDE SCH MG: 20 TABLET ORAL at 09:00

## 2018-08-19 RX ADMIN — LATANOPROST SCH ML: 50 SOLUTION OPHTHALMIC at 21:00

## 2018-08-19 NOTE — PROGRESS NOTE
DATE:  August 19, 2018 



SUBJECTIVE:  The patient is currently very agitated.  He has some 

underlying dementia.  Per cardiology note, no further intervention is 

needed, just recommends currently with heparin drip.  The patient is 

otherwise tolerating diet well with no other complaints.  Seroquel has been 

ordered as needed for his agitation and combativeness.  



OBJECTIVE  

VITAL SIGNS:  Temperature 97.5, pulse 79, respiratory rate 18, blood 

pressure 149/69, pulse oximetry 96%.  He is on room air. 

GENERAL:  Not in acute distress, currently very agitated and combative. 

HEENT:  Head is normocephalic, atraumatic.  Eyes:  Pupils equal, round and 

reactive to light bilaterally.  Extraocular movements intact bilaterally.  

Throat with no evidence of erythema or exudates in the posterior pharynx.  

He has poor dentition. 

NECK:  Supple with good range of motion. 

PULMONARY:  Clear to auscultation bilaterally.  No wheezing, no rales, no 

rhonchi and no crackles appreciated.  

CARDIOVASCULAR:  Positive S1 and S2, no murmurs, rubs or gallops 

appreciated. 

ABDOMEN:  Soft, nondistended, nontender on palpation.  Bowel sounds were 

present.

MUSCULOSKELETAL:  Strength 5/5 throughout, no evidence of musculoskeletal 

deficit on exam.  No weakness appreciated.  

NEUROLOGIC:  Cranial nerves II-XII grossly intact.  No evidence of 

neurological deficit on examination. 

SKIN:  Intact.  Warm to touch.  Good capillary refill. 

EXTREMITIES:  Warm to touch in the right lower extremity. 

PSYCHIATRIC:  Currently demented and combative. 



LABORATORY DATA:  White count of 5, hemoglobin 15, hematocrit 46 and 

platelets 206,000. Coagulation:  PTT 54.  Chemistry:  Sodium 140, potassium 

2.9, chloride 105, bicarb 25, anion gap of 12.9, BUN 16, creatinine 0.78.  

Calcium 8.6.  Microbiology:  Blood cultures negative. 



IMAGING STUDIES:  Pending.  



IMPRESSION 

1. Right lower extremity acute ischemic limb. 

2. Dementia. 

3. Hypertension. 

4. Peripheral neuropathy. 

5. Chronic pain. 



PLAN:  At this time, cardiology has been notified, and currently he is on 

heparin drip.  Will continue with heparin drip as per cardiology.  His note 

states that further recommendations will follow, but recommends to continue 

heparin drip for now.  I will communicate with him and see what the next 

plan of care is.  If he is some sort of angiogram candidate or surgical 

candidate, I am currently unsure at this time.  In relation to his 

agitation, I am going to give him some Seroquel 25 mg p.o. b.i.d. p.r.n. 

for agitation.  We are going to resume same home medications.  He is on a 

regular diet.  For DVT prophylaxis, he is on heparin drip.  









DD:  08/19/2018 10:00

DT:  08/19/2018 10:12

Job#:  [id number] ROMY

## 2018-08-19 NOTE — PROGRESS NOTE
DATE:  August 19, 2018 



CARDIOLOGY PROGRESS NOTE



SUBJECTIVE:  Mr. Lozada's foot is warmer today.  He remains on intravenous 

heparin.  



OBJECTIVE  

VITAL SIGNS:  Afebrile.  Heart rate is 81.  Blood pressure 117/62.  O2 sat 

is 96%. 

CARDIOVASCULAR:  Regular rhythm.

EXTREMITIES:  Pedal pulses are absent. 



ASSESSMENT:  Critical limb ischemia.



PLAN:  Continue intravenous heparin.  We will try and obtain consent from 

his family for angiography on his right leg tomorrow morning.











DD:  08/19/2018 13:49

DT:  08/19/2018 15:12

Job#:  [id number]

## 2018-08-20 VITALS — SYSTOLIC BLOOD PRESSURE: 171 MMHG | DIASTOLIC BLOOD PRESSURE: 69 MMHG

## 2018-08-20 VITALS — DIASTOLIC BLOOD PRESSURE: 62 MMHG | SYSTOLIC BLOOD PRESSURE: 134 MMHG

## 2018-08-20 VITALS — SYSTOLIC BLOOD PRESSURE: 145 MMHG | DIASTOLIC BLOOD PRESSURE: 68 MMHG

## 2018-08-20 VITALS — SYSTOLIC BLOOD PRESSURE: 148 MMHG | DIASTOLIC BLOOD PRESSURE: 60 MMHG

## 2018-08-20 VITALS — DIASTOLIC BLOOD PRESSURE: 70 MMHG | SYSTOLIC BLOOD PRESSURE: 142 MMHG

## 2018-08-20 VITALS — SYSTOLIC BLOOD PRESSURE: 137 MMHG | DIASTOLIC BLOOD PRESSURE: 72 MMHG

## 2018-08-20 VITALS — SYSTOLIC BLOOD PRESSURE: 167 MMHG | DIASTOLIC BLOOD PRESSURE: 66 MMHG

## 2018-08-20 VITALS — SYSTOLIC BLOOD PRESSURE: 177 MMHG | DIASTOLIC BLOOD PRESSURE: 68 MMHG

## 2018-08-20 VITALS — SYSTOLIC BLOOD PRESSURE: 132 MMHG | DIASTOLIC BLOOD PRESSURE: 66 MMHG

## 2018-08-20 VITALS — SYSTOLIC BLOOD PRESSURE: 157 MMHG | DIASTOLIC BLOOD PRESSURE: 62 MMHG

## 2018-08-20 VITALS — DIASTOLIC BLOOD PRESSURE: 64 MMHG | SYSTOLIC BLOOD PRESSURE: 141 MMHG

## 2018-08-20 VITALS — SYSTOLIC BLOOD PRESSURE: 132 MMHG | DIASTOLIC BLOOD PRESSURE: 59 MMHG

## 2018-08-20 VITALS — SYSTOLIC BLOOD PRESSURE: 157 MMHG | DIASTOLIC BLOOD PRESSURE: 68 MMHG

## 2018-08-20 VITALS — SYSTOLIC BLOOD PRESSURE: 148 MMHG | DIASTOLIC BLOOD PRESSURE: 69 MMHG

## 2018-08-20 VITALS — DIASTOLIC BLOOD PRESSURE: 63 MMHG | SYSTOLIC BLOOD PRESSURE: 132 MMHG

## 2018-08-20 VITALS — DIASTOLIC BLOOD PRESSURE: 77 MMHG | SYSTOLIC BLOOD PRESSURE: 152 MMHG

## 2018-08-20 VITALS — DIASTOLIC BLOOD PRESSURE: 70 MMHG | SYSTOLIC BLOOD PRESSURE: 158 MMHG

## 2018-08-20 VITALS — DIASTOLIC BLOOD PRESSURE: 68 MMHG | SYSTOLIC BLOOD PRESSURE: 144 MMHG

## 2018-08-20 VITALS — DIASTOLIC BLOOD PRESSURE: 62 MMHG | SYSTOLIC BLOOD PRESSURE: 138 MMHG

## 2018-08-20 VITALS — SYSTOLIC BLOOD PRESSURE: 141 MMHG | DIASTOLIC BLOOD PRESSURE: 64 MMHG

## 2018-08-20 VITALS — DIASTOLIC BLOOD PRESSURE: 68 MMHG | SYSTOLIC BLOOD PRESSURE: 155 MMHG

## 2018-08-20 VITALS — SYSTOLIC BLOOD PRESSURE: 123 MMHG | DIASTOLIC BLOOD PRESSURE: 71 MMHG

## 2018-08-20 VITALS — DIASTOLIC BLOOD PRESSURE: 64 MMHG | SYSTOLIC BLOOD PRESSURE: 147 MMHG

## 2018-08-20 VITALS — DIASTOLIC BLOOD PRESSURE: 71 MMHG | SYSTOLIC BLOOD PRESSURE: 151 MMHG

## 2018-08-20 VITALS — DIASTOLIC BLOOD PRESSURE: 69 MMHG | SYSTOLIC BLOOD PRESSURE: 145 MMHG

## 2018-08-20 VITALS — SYSTOLIC BLOOD PRESSURE: 169 MMHG | DIASTOLIC BLOOD PRESSURE: 69 MMHG

## 2018-08-20 VITALS — SYSTOLIC BLOOD PRESSURE: 136 MMHG | DIASTOLIC BLOOD PRESSURE: 66 MMHG

## 2018-08-20 VITALS — DIASTOLIC BLOOD PRESSURE: 74 MMHG | SYSTOLIC BLOOD PRESSURE: 135 MMHG

## 2018-08-20 VITALS — SYSTOLIC BLOOD PRESSURE: 141 MMHG | DIASTOLIC BLOOD PRESSURE: 67 MMHG

## 2018-08-20 VITALS — DIASTOLIC BLOOD PRESSURE: 73 MMHG | SYSTOLIC BLOOD PRESSURE: 154 MMHG

## 2018-08-20 VITALS — DIASTOLIC BLOOD PRESSURE: 68 MMHG | SYSTOLIC BLOOD PRESSURE: 133 MMHG

## 2018-08-20 VITALS — SYSTOLIC BLOOD PRESSURE: 135 MMHG | DIASTOLIC BLOOD PRESSURE: 66 MMHG

## 2018-08-20 VITALS — SYSTOLIC BLOOD PRESSURE: 147 MMHG | DIASTOLIC BLOOD PRESSURE: 68 MMHG

## 2018-08-20 VITALS — DIASTOLIC BLOOD PRESSURE: 70 MMHG | SYSTOLIC BLOOD PRESSURE: 167 MMHG

## 2018-08-20 VITALS — SYSTOLIC BLOOD PRESSURE: 148 MMHG | DIASTOLIC BLOOD PRESSURE: 70 MMHG

## 2018-08-20 VITALS — SYSTOLIC BLOOD PRESSURE: 162 MMHG | DIASTOLIC BLOOD PRESSURE: 64 MMHG

## 2018-08-20 VITALS — SYSTOLIC BLOOD PRESSURE: 99 MMHG | DIASTOLIC BLOOD PRESSURE: 71 MMHG

## 2018-08-20 VITALS — DIASTOLIC BLOOD PRESSURE: 71 MMHG | SYSTOLIC BLOOD PRESSURE: 145 MMHG

## 2018-08-20 LAB
ALBUMIN SERPL-MCNC: 2.5 G/DL (ref 3.5–5)
ALBUMIN/GLOB SERPL: 0.7 {RATIO} (ref 0.8–2)
ALP SERPL-CCNC: 180 IU/L (ref 40–150)
ALT SERPL-CCNC: 22 IU/L (ref 0–55)
ANION GAP SERPL CALC-SCNC: 11.2 MMOL/L (ref 8–16)
BASOPHILS # BLD AUTO: 0.1 10*3/UL (ref 0–0.1)
BASOPHILS NFR BLD AUTO: 1 % (ref 0–1)
BUN SERPL-MCNC: 17 MG/DL (ref 7–26)
BUN/CREAT SERPL: 20 (ref 6–25)
CALCIUM SERPL-MCNC: 8.7 MG/DL (ref 8.4–10.2)
CHLORIDE SERPL-SCNC: 100 MMOL/L (ref 98–107)
CHOLEST SERPL-MCNC: 157 MD/DL (ref 0–199)
CHOLEST/HDLC SERPL: 4 {RATIO} (ref 3.9–4.7)
CO2 SERPL-SCNC: 29 MMOL/L (ref 22–29)
DEPRECATED NEUTROPHILS # BLD AUTO: 2.9 10*3/UL (ref 2.1–6.9)
EGFRCR SERPLBLD CKD-EPI 2021: > 60 ML/MIN (ref 60–?)
EOSINOPHIL # BLD AUTO: 0.4 10*3/UL (ref 0–0.4)
EOSINOPHIL NFR BLD AUTO: 8.2 % (ref 0–6)
ERYTHROCYTE [DISTWIDTH] IN CORD BLOOD: 15.8 % (ref 11.7–14.4)
GLOBULIN PLAS-MCNC: 3.7 G/DL (ref 2.3–3.5)
GLUCOSE SERPLBLD-MCNC: 97 MG/DL (ref 74–118)
HCT VFR BLD AUTO: 45.7 % (ref 38.2–49.6)
HDLC SERPL-MSCNC: 39 MG/DL (ref 40–60)
HGB BLD-MCNC: 15 G/DL (ref 14–18)
LDLC SERPL CALC-MCNC: 100 MG/DL (ref 60–130)
LYMPHOCYTES # BLD: 1 10*3/UL (ref 1–3.2)
LYMPHOCYTES NFR BLD AUTO: 20.6 % (ref 18–39.1)
MCH RBC QN AUTO: 30.7 PG (ref 28–32)
MCHC RBC AUTO-ENTMCNC: 32.8 G/DL (ref 31–35)
MCV RBC AUTO: 93.5 FL (ref 81–99)
MONOCYTES # BLD AUTO: 0.5 10*3/UL (ref 0.2–0.8)
MONOCYTES NFR BLD AUTO: 10.8 % (ref 4.4–11.3)
NEUTS SEG NFR BLD AUTO: 59 % (ref 38.7–80)
PLATELET # BLD AUTO: 186 X10E3/UL (ref 140–360)
POTASSIUM SERPL-SCNC: 3.2 MMOL/L (ref 3.5–5.1)
RBC # BLD AUTO: 4.89 X10E6/UL (ref 4.3–5.7)
SODIUM SERPL-SCNC: 137 MMOL/L (ref 136–145)
TRIGL SERPL-MCNC: 92 MG/DL (ref 0–149)

## 2018-08-20 PROCEDURE — B41D1ZZ FLUOROSCOPY OF AORTA AND BILATERAL LOWER EXTREMITY ARTERIES USING LOW OSMOLAR CONTRAST: ICD-10-PCS | Performed by: INTERNAL MEDICINE

## 2018-08-20 RX ADMIN — Medication SCH MG: at 08:42

## 2018-08-20 RX ADMIN — SODIUM CHLORIDE SCH MLS/HR: 9 INJECTION, SOLUTION INTRAVENOUS at 23:39

## 2018-08-20 RX ADMIN — SODIUM CHLORIDE SCH MLS/HR: 9 INJECTION, SOLUTION INTRAVENOUS at 05:49

## 2018-08-20 RX ADMIN — METOPROLOL TARTRATE SCH MG: 25 TABLET, FILM COATED ORAL at 08:42

## 2018-08-20 RX ADMIN — Medication SCH MLS/HR: at 18:29

## 2018-08-20 RX ADMIN — Medication SCH MG: at 17:54

## 2018-08-20 RX ADMIN — INSULIN HUMAN SCH UNIT: 100 INJECTION, SOLUTION PARENTERAL at 16:30

## 2018-08-20 RX ADMIN — INSULIN HUMAN SCH UNIT: 100 INJECTION, SOLUTION PARENTERAL at 07:30

## 2018-08-20 RX ADMIN — FUROSEMIDE SCH MG: 20 TABLET ORAL at 17:54

## 2018-08-20 RX ADMIN — LATANOPROST SCH ML: 50 SOLUTION OPHTHALMIC at 20:55

## 2018-08-20 RX ADMIN — INSULIN HUMAN SCH UNIT: 100 INJECTION, SOLUTION PARENTERAL at 21:00

## 2018-08-20 RX ADMIN — Medication PRN MG: at 23:40

## 2018-08-20 RX ADMIN — MEMANTINE HYDROCHLORIDE SCH MG: 10 TABLET ORAL at 17:53

## 2018-08-20 RX ADMIN — PANTOPRAZOLE SODIUM SCH MG: 40 TABLET, DELAYED RELEASE ORAL at 08:42

## 2018-08-20 RX ADMIN — FUROSEMIDE SCH MG: 20 TABLET ORAL at 08:42

## 2018-08-20 RX ADMIN — TAMSULOSIN HYDROCHLORIDE SCH MG: 0.4 CAPSULE ORAL at 08:42

## 2018-08-20 RX ADMIN — CLONAZEPAM PRN MG: 0.5 TABLET ORAL at 10:55

## 2018-08-20 RX ADMIN — SODIUM CHLORIDE SCH MLS/HR: 9 INJECTION, SOLUTION INTRAVENOUS at 02:00

## 2018-08-20 RX ADMIN — SODIUM CHLORIDE SCH MLS/HR: 9 INJECTION, SOLUTION INTRAVENOUS at 10:55

## 2018-08-20 RX ADMIN — Medication PRN MG: at 17:11

## 2018-08-20 RX ADMIN — Medication SCH MG: at 20:55

## 2018-08-20 RX ADMIN — ASPIRIN 81 MG CHEWABLE TABLET SCH MG: 81 TABLET CHEWABLE at 08:42

## 2018-08-20 RX ADMIN — MEMANTINE HYDROCHLORIDE SCH MG: 10 TABLET ORAL at 08:42

## 2018-08-20 RX ADMIN — Medication SCH MLS/HR: at 22:47

## 2018-08-20 RX ADMIN — INSULIN HUMAN SCH UNIT: 100 INJECTION, SOLUTION PARENTERAL at 11:30

## 2018-08-20 RX ADMIN — SODIUM CHLORIDE SCH MLS/HR: 900 INJECTION, SOLUTION INTRAVENOUS at 17:49

## 2018-08-20 RX ADMIN — Medication SCH MLS/HR: at 17:00

## 2018-08-20 RX ADMIN — CLONAZEPAM PRN MG: 0.5 TABLET ORAL at 17:53

## 2018-08-20 NOTE — PROGRESS NOTE
DATE:  August 20, 2018 



CARDIOLOGY PROGRESS NOTE 



SUBJECTIVE:  Patient denies chest pain or shortness of breath.  He was 

scheduled for peripheral angiogram today with finding of extensive plaque 

with critical stenosis and occlusion of the iliac artery as well as 

complete occlusion of the right SFA in the mid portion.  Transcatheter 

lysis was initiated, and the patient was transferred to the ICU.



OBJECTIVE

VITAL SIGNS:  Temperature 96.7 degrees, pulse 79, respiratory rate 18, 

blood pressure 133/60, oxygen saturation 99% on room air.

GENERAL:  Awake, alert, in no acute distress. 

LUNGS:  Clear to auscultation bilaterally.  No wheezes or crackles.

CARDIOVASCULAR:  Normal rate, regular rhythm.  No murmur.  Normal S1 and 

S2.

ABDOMEN:  Soft.  Nontender. 

EXTREMITIES:  No edema.  Right limb is cool on palpation.



TELEMETRY:  V-paced.  



CARDIAC MEDICATIONS 

1. Furosemide 20 mg p.o. b.i.d. 

2. Heparin drip.

3. Metoprolol tartrate 75 mg p.o. daily.

4. Aspirin 81 mg p.o. daily.



LABS:  WBC 4.9, hemoglobin 15, hematocrit 45.7, platelets 186.  Sodium 137, 

potassium 3.2, chloride 100, CO2 29, BUN 17, creatinine 0.85.    



IMPRESSION 

1. Acute limb ischemia of the right lower extremity.

2. Hypertension.

3. Dementia.

4. Peripheral arterial disease with prior transmetatarsal amputation 

bilaterally.

5. Atrial fibrillation on anticoagulation.



RECOMMENDATIONS:  Continue heparin drip as well as transcatheter lysis.  

Patient will need to be taken back to the cath lab tomorrow for repeat 

peripheral angiogram.  In the meantime, continue current cardiac 

medications.  Monitor the patient on telemetry.  



Thank you for this consult.  We will continue to follow. 









DD:  08/20/2018 18:18

DT:  08/20/2018 18:32

Job#:  [id number]

## 2018-08-20 NOTE — OPERATIVE REPORT
DATE OF PROCEDURE:  August 20, 2018

 

CARDIAC CATH LAB PROCEDURE NOTE



INDICATION:  Peripheral arterial disease with critical limb ischemia of the 

right lower extremity.  



PROCEDURES PERFORMED:

1. Abdominal aorta catheter placement with abdominal aortogram. 

2. Selective catheter placement from the left femoral artery to the 

right superficial femoral artery.  

3. Additional 3rd-order catheter placement from the left femoral artery 

to the right popliteal artery.  

4. Primary thrombectomy of the right superficial femoral artery. 

5. Initiation of transcatheter lysis.  



COMPLICATIONS:  None. 



RECOMMENDATIONS:  Reassess for reperfusion in the next 24 hours.  



Access was obtained in the left femoral artery.  A 6-[place] sheath was 

placed.  There was very extensive tortuosity in the iliac artery systolic, 

and extensive plaque found with critical stenosis and occlusion.  The 

catheter was then advanced from the left femoral artery to the right 

superficial femoral artery with complete occlusion in the mid portion.  

Left femoral-popliteal and the right femoral artery was noted with 

extensive thrombus, slow flow/trickle flow was noted to the right foot.  

The catheter was then advanced to the popliteal artery and angiography 

completed. 





The catheter was then exchanged to a Jj lysis catheter and 2 mg of 

t-PA ________ bolus was given with intra-arterial infusion of heparin and 

t-PA at 5 mg per hour initiated.  The sheath was secured in place, and the 

patient was transferred to the ICU in stable condition.  







DD:  08/20/2018 14:30

DT:  08/20/2018 15:36

Job#:  [id number]

## 2018-08-20 NOTE — PROGRESS NOTE
DATE:  August 20, 2018 



SUBJECTIVE:  Patient is scheduled for lower extremity angiogram later today 

by cardiology.  Patient was very combative last night, refusing to have his 

PTT drawn via labs.  At this time, he is very comfortable, awake, alert, 

and he is very cooperative on examination.  Patient had breakfast first 

thing this morning, but now he is n.p.o. for angiogram later this 

afternoon.



OBJECTIVE/PHYSICAL EXAMINATION:

VITAL SIGNS:  Temperature is 96.7, pulse 81, respiratory rate is 18, blood 

pressure 152/77.

GENERAL:  Not in acute distress, alert and oriented x3, cooperative on 

examination.

HEENT:  Head:  Normocephalic, atraumatic.  Eyes:  Pupils equal, round, and 

reactive to light bilaterally.  Extraocular movements intact bilaterally.  

Throat:  No evidence of any erythema or exudates in the posterior pharynx.  

Has poor dentition.

NECK:  Supple with good range of motion.

PULMONARY:  Clear to auscultation bilaterally.  No wheezing, no rales, no 

rhonchi, no crackles appreciated.

CARDIOVASCULAR:  Positive S1 and S2.  No murmurs, rubs, or gallops 

appreciated.

ABDOMEN:  Soft, nondistended, nontender to palpation.  Bowel sounds are 

present.

MUSCULOSKELETAL:  Strength is 5/5 throughout.  No evidence of any 

musculoskeletal deficit on examination.  No weakness appreciated.

NEUROLOGICAL:  Cranial nerves II through XII are grossly intact.  No 

evidence of any neurologic deficits on exam.

SKIN:  Intact.  Warm to touch.  Good cap refill.

PSYCHIATRIC:  Normal affect and mood.

EXTREMITIES:  No edema.  Good range of motion throughout.



LABS:  Showed white count of 4.9, hemoglobin 15, hematocrit is 46, 

platelets of 186,000.  Chemistry:  Sodium 137, potassium 3.2, chloride is 

100, bicarb 29, anion gap of 11, BUN is 17, creatinine is 0.85, glucose is 

97.  Total bilirubin is 1.4, AST 27, ALT 22, alk phos 180, total protein 

6.2.



MICROBIOLOGY:  Blood cultures were negative.



IMAGING STUDIES:  Arterial Doppler shows severe focal stenosis 75% to 99% 

in the mid segment of the right femoral artery.  There is also severe focal 

stenosis 75% to 99% in the mid segment of the left femoral artery.  

Recommend angiogram which he will get later today.



IMPRESSION:

1. Right lower extremity acute ischemic limb, scheduled for lower extremity 

angiogram.

2. Dementia with sundowning.

3. Hypertension.

4. Peripheral neuropathy.

5. Chronic pain.



PLAN:  At this time, the patient will have an angiogram later this 

afternoon by cardiology.  Will continue with heparin drip for now.  Patient 

is much more calmer with the Seroquel given yesterday.  Once we get the 

results from the angiogram, determine the next plan of care by cardiology, 

will determine when the patient can be discharged home.  If his studies are 

done today with intervention and no further workup needed, he will be 

likely discharged home tomorrow.







DD:  08/20/2018 10:08

DT:  08/20/2018 10:19

Job#:  [id number]

## 2018-08-21 VITALS — DIASTOLIC BLOOD PRESSURE: 70 MMHG | SYSTOLIC BLOOD PRESSURE: 170 MMHG

## 2018-08-21 VITALS — DIASTOLIC BLOOD PRESSURE: 83 MMHG | SYSTOLIC BLOOD PRESSURE: 193 MMHG

## 2018-08-21 VITALS — SYSTOLIC BLOOD PRESSURE: 182 MMHG | DIASTOLIC BLOOD PRESSURE: 74 MMHG

## 2018-08-21 VITALS — SYSTOLIC BLOOD PRESSURE: 168 MMHG | DIASTOLIC BLOOD PRESSURE: 69 MMHG

## 2018-08-21 VITALS — SYSTOLIC BLOOD PRESSURE: 160 MMHG | DIASTOLIC BLOOD PRESSURE: 65 MMHG

## 2018-08-21 VITALS — DIASTOLIC BLOOD PRESSURE: 63 MMHG | SYSTOLIC BLOOD PRESSURE: 145 MMHG

## 2018-08-21 VITALS — DIASTOLIC BLOOD PRESSURE: 64 MMHG | SYSTOLIC BLOOD PRESSURE: 160 MMHG

## 2018-08-21 VITALS — DIASTOLIC BLOOD PRESSURE: 72 MMHG | SYSTOLIC BLOOD PRESSURE: 137 MMHG

## 2018-08-21 VITALS — SYSTOLIC BLOOD PRESSURE: 153 MMHG | DIASTOLIC BLOOD PRESSURE: 62 MMHG

## 2018-08-21 VITALS — DIASTOLIC BLOOD PRESSURE: 79 MMHG | SYSTOLIC BLOOD PRESSURE: 193 MMHG

## 2018-08-21 VITALS — DIASTOLIC BLOOD PRESSURE: 57 MMHG | SYSTOLIC BLOOD PRESSURE: 147 MMHG

## 2018-08-21 VITALS — DIASTOLIC BLOOD PRESSURE: 83 MMHG | SYSTOLIC BLOOD PRESSURE: 186 MMHG

## 2018-08-21 VITALS — SYSTOLIC BLOOD PRESSURE: 121 MMHG | DIASTOLIC BLOOD PRESSURE: 58 MMHG

## 2018-08-21 VITALS — SYSTOLIC BLOOD PRESSURE: 124 MMHG | DIASTOLIC BLOOD PRESSURE: 60 MMHG

## 2018-08-21 VITALS — SYSTOLIC BLOOD PRESSURE: 156 MMHG | DIASTOLIC BLOOD PRESSURE: 62 MMHG

## 2018-08-21 VITALS — SYSTOLIC BLOOD PRESSURE: 179 MMHG | DIASTOLIC BLOOD PRESSURE: 87 MMHG

## 2018-08-21 VITALS — SYSTOLIC BLOOD PRESSURE: 127 MMHG | DIASTOLIC BLOOD PRESSURE: 64 MMHG

## 2018-08-21 VITALS — DIASTOLIC BLOOD PRESSURE: 53 MMHG | SYSTOLIC BLOOD PRESSURE: 114 MMHG

## 2018-08-21 VITALS — SYSTOLIC BLOOD PRESSURE: 163 MMHG | DIASTOLIC BLOOD PRESSURE: 60 MMHG

## 2018-08-21 VITALS — DIASTOLIC BLOOD PRESSURE: 60 MMHG | SYSTOLIC BLOOD PRESSURE: 101 MMHG

## 2018-08-21 VITALS — SYSTOLIC BLOOD PRESSURE: 157 MMHG | DIASTOLIC BLOOD PRESSURE: 70 MMHG

## 2018-08-21 VITALS — SYSTOLIC BLOOD PRESSURE: 146 MMHG | DIASTOLIC BLOOD PRESSURE: 68 MMHG

## 2018-08-21 VITALS — DIASTOLIC BLOOD PRESSURE: 62 MMHG | SYSTOLIC BLOOD PRESSURE: 156 MMHG

## 2018-08-21 VITALS — SYSTOLIC BLOOD PRESSURE: 159 MMHG | DIASTOLIC BLOOD PRESSURE: 107 MMHG

## 2018-08-21 VITALS — DIASTOLIC BLOOD PRESSURE: 72 MMHG | SYSTOLIC BLOOD PRESSURE: 172 MMHG

## 2018-08-21 VITALS — SYSTOLIC BLOOD PRESSURE: 148 MMHG | DIASTOLIC BLOOD PRESSURE: 69 MMHG

## 2018-08-21 VITALS — DIASTOLIC BLOOD PRESSURE: 80 MMHG | SYSTOLIC BLOOD PRESSURE: 180 MMHG

## 2018-08-21 VITALS — SYSTOLIC BLOOD PRESSURE: 176 MMHG | DIASTOLIC BLOOD PRESSURE: 60 MMHG

## 2018-08-21 VITALS — DIASTOLIC BLOOD PRESSURE: 56 MMHG | SYSTOLIC BLOOD PRESSURE: 125 MMHG

## 2018-08-21 VITALS — DIASTOLIC BLOOD PRESSURE: 64 MMHG | SYSTOLIC BLOOD PRESSURE: 165 MMHG

## 2018-08-21 VITALS — SYSTOLIC BLOOD PRESSURE: 145 MMHG | DIASTOLIC BLOOD PRESSURE: 78 MMHG

## 2018-08-21 VITALS — SYSTOLIC BLOOD PRESSURE: 132 MMHG | DIASTOLIC BLOOD PRESSURE: 65 MMHG

## 2018-08-21 VITALS — DIASTOLIC BLOOD PRESSURE: 57 MMHG | SYSTOLIC BLOOD PRESSURE: 132 MMHG

## 2018-08-21 VITALS — DIASTOLIC BLOOD PRESSURE: 71 MMHG | SYSTOLIC BLOOD PRESSURE: 170 MMHG

## 2018-08-21 VITALS — DIASTOLIC BLOOD PRESSURE: 77 MMHG | SYSTOLIC BLOOD PRESSURE: 132 MMHG

## 2018-08-21 VITALS — DIASTOLIC BLOOD PRESSURE: 65 MMHG | SYSTOLIC BLOOD PRESSURE: 164 MMHG

## 2018-08-21 VITALS — SYSTOLIC BLOOD PRESSURE: 143 MMHG | DIASTOLIC BLOOD PRESSURE: 57 MMHG

## 2018-08-21 VITALS — SYSTOLIC BLOOD PRESSURE: 172 MMHG | DIASTOLIC BLOOD PRESSURE: 74 MMHG

## 2018-08-21 VITALS — DIASTOLIC BLOOD PRESSURE: 67 MMHG | SYSTOLIC BLOOD PRESSURE: 145 MMHG

## 2018-08-21 VITALS — SYSTOLIC BLOOD PRESSURE: 134 MMHG | DIASTOLIC BLOOD PRESSURE: 63 MMHG

## 2018-08-21 VITALS — DIASTOLIC BLOOD PRESSURE: 68 MMHG | SYSTOLIC BLOOD PRESSURE: 166 MMHG

## 2018-08-21 VITALS — DIASTOLIC BLOOD PRESSURE: 70 MMHG | SYSTOLIC BLOOD PRESSURE: 157 MMHG

## 2018-08-21 VITALS — SYSTOLIC BLOOD PRESSURE: 160 MMHG | DIASTOLIC BLOOD PRESSURE: 64 MMHG

## 2018-08-21 VITALS — DIASTOLIC BLOOD PRESSURE: 72 MMHG | SYSTOLIC BLOOD PRESSURE: 174 MMHG

## 2018-08-21 VITALS — DIASTOLIC BLOOD PRESSURE: 67 MMHG | SYSTOLIC BLOOD PRESSURE: 147 MMHG

## 2018-08-21 VITALS — SYSTOLIC BLOOD PRESSURE: 174 MMHG | DIASTOLIC BLOOD PRESSURE: 74 MMHG

## 2018-08-21 VITALS — DIASTOLIC BLOOD PRESSURE: 72 MMHG | SYSTOLIC BLOOD PRESSURE: 150 MMHG

## 2018-08-21 VITALS — DIASTOLIC BLOOD PRESSURE: 66 MMHG | SYSTOLIC BLOOD PRESSURE: 153 MMHG

## 2018-08-21 VITALS — SYSTOLIC BLOOD PRESSURE: 142 MMHG | DIASTOLIC BLOOD PRESSURE: 60 MMHG

## 2018-08-21 VITALS — DIASTOLIC BLOOD PRESSURE: 79 MMHG | SYSTOLIC BLOOD PRESSURE: 159 MMHG

## 2018-08-21 VITALS — SYSTOLIC BLOOD PRESSURE: 195 MMHG | DIASTOLIC BLOOD PRESSURE: 75 MMHG

## 2018-08-21 VITALS — DIASTOLIC BLOOD PRESSURE: 67 MMHG | SYSTOLIC BLOOD PRESSURE: 157 MMHG

## 2018-08-21 VITALS — SYSTOLIC BLOOD PRESSURE: 140 MMHG | DIASTOLIC BLOOD PRESSURE: 53 MMHG

## 2018-08-21 VITALS — DIASTOLIC BLOOD PRESSURE: 70 MMHG | SYSTOLIC BLOOD PRESSURE: 175 MMHG

## 2018-08-21 VITALS — SYSTOLIC BLOOD PRESSURE: 150 MMHG | DIASTOLIC BLOOD PRESSURE: 77 MMHG

## 2018-08-21 VITALS — DIASTOLIC BLOOD PRESSURE: 60 MMHG | SYSTOLIC BLOOD PRESSURE: 141 MMHG

## 2018-08-21 VITALS — DIASTOLIC BLOOD PRESSURE: 67 MMHG | SYSTOLIC BLOOD PRESSURE: 137 MMHG

## 2018-08-21 VITALS — SYSTOLIC BLOOD PRESSURE: 139 MMHG | DIASTOLIC BLOOD PRESSURE: 67 MMHG

## 2018-08-21 VITALS — SYSTOLIC BLOOD PRESSURE: 124 MMHG | DIASTOLIC BLOOD PRESSURE: 69 MMHG

## 2018-08-21 VITALS — SYSTOLIC BLOOD PRESSURE: 129 MMHG | DIASTOLIC BLOOD PRESSURE: 82 MMHG

## 2018-08-21 VITALS — DIASTOLIC BLOOD PRESSURE: 68 MMHG | SYSTOLIC BLOOD PRESSURE: 157 MMHG

## 2018-08-21 VITALS — DIASTOLIC BLOOD PRESSURE: 68 MMHG | SYSTOLIC BLOOD PRESSURE: 151 MMHG

## 2018-08-21 VITALS — DIASTOLIC BLOOD PRESSURE: 64 MMHG | SYSTOLIC BLOOD PRESSURE: 155 MMHG

## 2018-08-21 VITALS — DIASTOLIC BLOOD PRESSURE: 60 MMHG | SYSTOLIC BLOOD PRESSURE: 166 MMHG

## 2018-08-21 VITALS — SYSTOLIC BLOOD PRESSURE: 166 MMHG | DIASTOLIC BLOOD PRESSURE: 67 MMHG

## 2018-08-21 VITALS — DIASTOLIC BLOOD PRESSURE: 60 MMHG | SYSTOLIC BLOOD PRESSURE: 121 MMHG

## 2018-08-21 VITALS — DIASTOLIC BLOOD PRESSURE: 67 MMHG | SYSTOLIC BLOOD PRESSURE: 139 MMHG

## 2018-08-21 VITALS — DIASTOLIC BLOOD PRESSURE: 97 MMHG | SYSTOLIC BLOOD PRESSURE: 113 MMHG

## 2018-08-21 VITALS — SYSTOLIC BLOOD PRESSURE: 171 MMHG | DIASTOLIC BLOOD PRESSURE: 79 MMHG

## 2018-08-21 VITALS — SYSTOLIC BLOOD PRESSURE: 155 MMHG | DIASTOLIC BLOOD PRESSURE: 60 MMHG

## 2018-08-21 VITALS — DIASTOLIC BLOOD PRESSURE: 67 MMHG | SYSTOLIC BLOOD PRESSURE: 161 MMHG

## 2018-08-21 VITALS — SYSTOLIC BLOOD PRESSURE: 135 MMHG | DIASTOLIC BLOOD PRESSURE: 61 MMHG

## 2018-08-21 VITALS — DIASTOLIC BLOOD PRESSURE: 67 MMHG | SYSTOLIC BLOOD PRESSURE: 167 MMHG

## 2018-08-21 VITALS — DIASTOLIC BLOOD PRESSURE: 57 MMHG | SYSTOLIC BLOOD PRESSURE: 138 MMHG

## 2018-08-21 VITALS — DIASTOLIC BLOOD PRESSURE: 67 MMHG | SYSTOLIC BLOOD PRESSURE: 180 MMHG

## 2018-08-21 VITALS — DIASTOLIC BLOOD PRESSURE: 80 MMHG | SYSTOLIC BLOOD PRESSURE: 134 MMHG

## 2018-08-21 VITALS — SYSTOLIC BLOOD PRESSURE: 140 MMHG | DIASTOLIC BLOOD PRESSURE: 65 MMHG

## 2018-08-21 VITALS — DIASTOLIC BLOOD PRESSURE: 91 MMHG | SYSTOLIC BLOOD PRESSURE: 106 MMHG

## 2018-08-21 VITALS — DIASTOLIC BLOOD PRESSURE: 57 MMHG | SYSTOLIC BLOOD PRESSURE: 129 MMHG

## 2018-08-21 VITALS — SYSTOLIC BLOOD PRESSURE: 153 MMHG | DIASTOLIC BLOOD PRESSURE: 64 MMHG

## 2018-08-21 VITALS — DIASTOLIC BLOOD PRESSURE: 64 MMHG | SYSTOLIC BLOOD PRESSURE: 117 MMHG

## 2018-08-21 VITALS — DIASTOLIC BLOOD PRESSURE: 70 MMHG | SYSTOLIC BLOOD PRESSURE: 145 MMHG

## 2018-08-21 VITALS — SYSTOLIC BLOOD PRESSURE: 142 MMHG | DIASTOLIC BLOOD PRESSURE: 100 MMHG

## 2018-08-21 VITALS — SYSTOLIC BLOOD PRESSURE: 161 MMHG | DIASTOLIC BLOOD PRESSURE: 64 MMHG

## 2018-08-21 VITALS — DIASTOLIC BLOOD PRESSURE: 78 MMHG | SYSTOLIC BLOOD PRESSURE: 176 MMHG

## 2018-08-21 VITALS — SYSTOLIC BLOOD PRESSURE: 155 MMHG | DIASTOLIC BLOOD PRESSURE: 70 MMHG

## 2018-08-21 VITALS — DIASTOLIC BLOOD PRESSURE: 66 MMHG | SYSTOLIC BLOOD PRESSURE: 146 MMHG

## 2018-08-21 LAB
ANION GAP SERPL CALC-SCNC: 14 MMOL/L (ref 8–16)
BASOPHILS # BLD AUTO: 0 10*3/UL (ref 0–0.1)
BASOPHILS NFR BLD AUTO: 0.4 % (ref 0–1)
BUN SERPL-MCNC: 16 MG/DL (ref 7–26)
BUN/CREAT SERPL: 19 (ref 6–25)
CALCIUM SERPL-MCNC: 8.8 MG/DL (ref 8.4–10.2)
CHLORIDE SERPL-SCNC: 102 MMOL/L (ref 98–107)
CO2 SERPL-SCNC: 28 MMOL/L (ref 22–29)
DEPRECATED NEUTROPHILS # BLD AUTO: 4.8 10*3/UL (ref 2.1–6.9)
EGFRCR SERPLBLD CKD-EPI 2021: > 60 ML/MIN (ref 60–?)
EOSINOPHIL # BLD AUTO: 0.1 10*3/UL (ref 0–0.4)
EOSINOPHIL NFR BLD AUTO: 2.1 % (ref 0–6)
ERYTHROCYTE [DISTWIDTH] IN CORD BLOOD: 15.9 % (ref 11.7–14.4)
GLUCOSE SERPLBLD-MCNC: 99 MG/DL (ref 74–118)
HCT VFR BLD AUTO: 47.4 % (ref 38.2–49.6)
HGB BLD-MCNC: 15.4 G/DL (ref 14–18)
LYMPHOCYTES # BLD: 1.1 10*3/UL (ref 1–3.2)
LYMPHOCYTES NFR BLD AUTO: 17 % (ref 18–39.1)
MCH RBC QN AUTO: 30.4 PG (ref 28–32)
MCHC RBC AUTO-ENTMCNC: 32.5 G/DL (ref 31–35)
MCV RBC AUTO: 93.5 FL (ref 81–99)
MONOCYTES # BLD AUTO: 0.6 10*3/UL (ref 0.2–0.8)
MONOCYTES NFR BLD AUTO: 8.8 % (ref 4.4–11.3)
NEUTS SEG NFR BLD AUTO: 71.4 % (ref 38.7–80)
PLATELET # BLD AUTO: 189 X10E3/UL (ref 140–360)
POTASSIUM SERPL-SCNC: 3 MMOL/L (ref 3.5–5.1)
RBC # BLD AUTO: 5.07 X10E6/UL (ref 4.3–5.7)
SODIUM SERPL-SCNC: 141 MMOL/L (ref 136–145)

## 2018-08-21 RX ADMIN — INSULIN HUMAN SCH UNIT: 100 INJECTION, SOLUTION PARENTERAL at 07:30

## 2018-08-21 RX ADMIN — FUROSEMIDE SCH MG: 20 TABLET ORAL at 17:00

## 2018-08-21 RX ADMIN — MEMANTINE HYDROCHLORIDE SCH MG: 10 TABLET ORAL at 09:00

## 2018-08-21 RX ADMIN — Medication PRN MG: at 20:24

## 2018-08-21 RX ADMIN — Medication PRN MG: at 03:03

## 2018-08-21 RX ADMIN — Medication PRN MG: at 16:36

## 2018-08-21 RX ADMIN — MUPIROCIN SCH GM: 20 OINTMENT TOPICAL at 11:21

## 2018-08-21 RX ADMIN — SODIUM CHLORIDE SCH MLS/HR: 900 INJECTION, SOLUTION INTRAVENOUS at 03:08

## 2018-08-21 RX ADMIN — Medication SCH MG: at 09:00

## 2018-08-21 RX ADMIN — ASPIRIN 81 MG CHEWABLE TABLET SCH MG: 81 TABLET CHEWABLE at 09:00

## 2018-08-21 RX ADMIN — CLONAZEPAM PRN MG: 0.5 TABLET ORAL at 15:00

## 2018-08-21 RX ADMIN — QUETIAPINE PRN MG: 25 TABLET, FILM COATED ORAL at 00:59

## 2018-08-21 RX ADMIN — INSULIN HUMAN SCH UNIT: 100 INJECTION, SOLUTION PARENTERAL at 11:30

## 2018-08-21 RX ADMIN — PANTOPRAZOLE SODIUM SCH MG: 40 TABLET, DELAYED RELEASE ORAL at 09:00

## 2018-08-21 RX ADMIN — INSULIN HUMAN SCH UNIT: 100 INJECTION, SOLUTION PARENTERAL at 21:00

## 2018-08-21 RX ADMIN — Medication SCH MLS/HR: at 10:30

## 2018-08-21 RX ADMIN — MEMANTINE HYDROCHLORIDE SCH MG: 10 TABLET ORAL at 17:00

## 2018-08-21 RX ADMIN — FUROSEMIDE SCH MG: 20 TABLET ORAL at 09:00

## 2018-08-21 RX ADMIN — TAMSULOSIN HYDROCHLORIDE SCH MG: 0.4 CAPSULE ORAL at 09:00

## 2018-08-21 RX ADMIN — HYDROCODONE BITARTRATE AND ACETAMINOPHEN PRN EA: 5; 325 TABLET ORAL at 12:30

## 2018-08-21 RX ADMIN — Medication SCH MG: at 15:14

## 2018-08-21 RX ADMIN — QUETIAPINE PRN MG: 25 TABLET, FILM COATED ORAL at 17:26

## 2018-08-21 RX ADMIN — INSULIN HUMAN SCH UNIT: 100 INJECTION, SOLUTION PARENTERAL at 16:30

## 2018-08-21 RX ADMIN — HYDROCODONE BITARTRATE AND ACETAMINOPHEN PRN EA: 5; 325 TABLET ORAL at 23:17

## 2018-08-21 RX ADMIN — Medication SCH MLS/HR: at 03:09

## 2018-08-21 RX ADMIN — CLONAZEPAM PRN MG: 0.5 TABLET ORAL at 01:03

## 2018-08-21 RX ADMIN — HEPARIN SODIUM SCH MLS/HR: 10000 INJECTION, SOLUTION INTRAVENOUS at 21:59

## 2018-08-21 RX ADMIN — LATANOPROST SCH ML: 50 SOLUTION OPHTHALMIC at 21:00

## 2018-08-21 RX ADMIN — Medication SCH MG: at 22:51

## 2018-08-21 RX ADMIN — Medication SCH MLS/HR: at 05:48

## 2018-08-21 RX ADMIN — Medication PRN MG: at 15:20

## 2018-08-21 RX ADMIN — METOPROLOL TARTRATE SCH MG: 25 TABLET, FILM COATED ORAL at 12:00

## 2018-08-21 NOTE — PROGRESS NOTE
DATE:  August 21, 2018 



CARDIOLOGY PROGRESS NOTE



SUBJECTIVE:  The patient is confused.  He was agitated overnight and had to 

be restrained. 



OBJECTIVE 

VITALS:  Temperature 96.6 degrees, pulse 82, respiratory rate 16, blood 

pressure 180/70, and oxygen saturation 97% on room air.

GENERAL:  Awake but confused.  No acute distress.  

LUNGS:  Clear to auscultation bilaterally.  No wheezes or crackles.

CARDIOVASCULAR:  Normal rate.  Regular rhythm.  No murmur.  Normal S1 and 

S2. 

ABDOMEN:  Soft and nontender.

EXTREMITIES:  No edema. Catheter present in left groin.  



CARDIAC MEDICATIONS 

1.  Metoprolol tartrate 25 mg p.o. daily.

2.  Furosemide 20 mg p.o. b.i.d. 

3.  Aspirin 81 mg p.o. daily.



LABS:  WBC 6.69, hemoglobin 15.4, hematocrit 47.4, and platelets 189,000.  

Sodium 141, potassium 3, chloride 102, CO2 28, BUN 15, creatinine 0.83.  

Telemetry is normal sinus rhythm. 



IMPRESSION 

1.  Acute limb ischemia of the right lower extremity:  Status post 

transcatheter lysis.

2.  Peripheral arterial disease with prior transmetatarsal amputation 

bilaterally. 

3.  Hypertension.

4.  Atrial fibrillation, on anticoagulation.

5.  Dementia.



RECOMMENDATIONS:  Patient was planned for periphreal angiogram and

removal of catheter and sheath today.  Unfortunately, cath lab is down due to 

technical difficulties after yesterday's power outage. Repeat peripheral 
angiogram 

cannot be done today.  Sheath was therefore removed from the left groin.  We 
will

need to resume heparin drip 6 hours after hemostasis is achieved.  Continue 

current cardiac medications otherwise.  Monitor the patient on telemetry.  
Attempt to 

take the patient back to the cath lab tomorrow if cath lab is running again.  

Management of agitation per primary service.  



Thank you for this consult.  We will continue to follow. 









DD:  08/21/2018 17:55

DT:  08/21/2018 18:09

Job#:  [id number] [id number]

## 2018-08-21 NOTE — PROGRESS NOTE
DATE:  August 21, 2018 



SUBJECTIVE:  Patient is doing well today. He was very combative last night, 

likely due to some sundowning.  Seems like the patient has some underlying 

Alzheimer's dementia with some sundowning, but the family states that he 

has a better memory than them. Patient was confused since the day of 

admission, and I feel like the patient likely has underlying dementia. He 

currently lives in a nursing home for the last 1 year now. Patient now is 

in restraints because he is very combative, requiring some Ativan and 

Seroquel for agitation. He had his sheath removed today in the cath lab.



OBJECTIVE

VITAL SIGNS:  Temperature is 96.5, pulse 82, respiratory rate is 16, blood 

pressure 180/70.



LAB FINDINGS:  Show white count is 6.6, hemoglobin 15.4, hematocrit of 47, 

platelets of 189.



MICROBIOLOGY:  There was 1 of 2 coag-negative staph, likely a contaminant. 

Patient is asymptomatic as well, afebrile. White count is normal. 



IMAGING STUDIES:   None.



OPERATIVE REPORT:  Patient had an abdominal aorta catheter placement with 

an abdominal aortogram with selective catheter placement from the left 

femoral artery to the right superficial femoral artery. Primary 

thrombectomy of the right superficial femoral artery was performed. 

Initiation of a transcatheter lysis was performed. 



PHYSICAL EXAMINATION

GENERAL:  Not in acute distress currently. He is calm and cooperative on 

restraints but gets very combative on examination.

HEENT:  Head:  Normocephalic, atraumatic.  Eyes:  Pupils equally round and 

reactive to light bilaterally. Extraocular movements intact bilaterally. 

Neck was supple with good range of motion. Throat:  No evidence of any 

erythema or exudates in the posterior pharynx. Has poor dentition.

PULMONARY:  Clear to auscultation bilaterally. No wheezing, no rales, no 

rhonchi, no crackles appreciated.

CARDIOVASCULAR:  Positive S1/S2. No murmurs, rubs or gallops appreciated.

ABDOMEN:  Soft, nondistended, nontender to palpation. Bowel sounds present.

MUSCULOSKELETAL:  Strength is 5/5 throughout. No evidence of any 

musculoskeletal deficit on examination. No weakness appreciated.

NEUROLOGICAL:  Cranial nerves 2-12 grossly intact. No evidence of any 

neurological deficit on exam.

SKIN:  Intact. Warm to touch. Good capillary refill.

PSYCHIATRIC:  Normal affect and mood.

EXTREMITIES:  No edema. Good range of motion throughout.



IMPRESSION 

1. Right lower extremity acute ischemic limb, status post right lower 

extremity angiogram with arterectomy performed on August 20, 2018, with 

sheath removed on August 21, 2018, and likely no more further 

intervention needed with good flow now.

2. Dementia with sundowning.

3. Hypertension.

4. Peripheral neuropathy.

5. Chronic pain syndrome.



PLAN:   At this time patient seems to be much more calm, and it seems like 

all the studies have been completed by Cardiology. We are going to continue 

with restraints because the patient is very combative and agitated, 

requiring some Seroquel and Ativan. We will continue with Seroquel and 

Ativan as needed and continue with restraints as well. His blood pressure 

is elevated, likely due to his agitation and refusal of medications. We 

will start him back on his oral 

antihypertensive medications as well and encourage that. It seems that the 

patient may be doing well tomorrow and would be likely to be discharged. We 

will also get a.m. labs.













DD:  08/21/2018 16:03

DT:  08/21/2018 16:09

Job#:  [id number]

## 2018-08-22 VITALS — DIASTOLIC BLOOD PRESSURE: 56 MMHG | SYSTOLIC BLOOD PRESSURE: 144 MMHG

## 2018-08-22 VITALS — SYSTOLIC BLOOD PRESSURE: 112 MMHG | DIASTOLIC BLOOD PRESSURE: 73 MMHG

## 2018-08-22 VITALS — DIASTOLIC BLOOD PRESSURE: 64 MMHG | SYSTOLIC BLOOD PRESSURE: 162 MMHG

## 2018-08-22 VITALS — SYSTOLIC BLOOD PRESSURE: 142 MMHG | DIASTOLIC BLOOD PRESSURE: 56 MMHG

## 2018-08-22 VITALS — SYSTOLIC BLOOD PRESSURE: 144 MMHG | DIASTOLIC BLOOD PRESSURE: 55 MMHG

## 2018-08-22 VITALS — DIASTOLIC BLOOD PRESSURE: 65 MMHG | SYSTOLIC BLOOD PRESSURE: 172 MMHG

## 2018-08-22 VITALS — SYSTOLIC BLOOD PRESSURE: 135 MMHG | DIASTOLIC BLOOD PRESSURE: 38 MMHG

## 2018-08-22 VITALS — SYSTOLIC BLOOD PRESSURE: 146 MMHG | DIASTOLIC BLOOD PRESSURE: 53 MMHG

## 2018-08-22 VITALS — SYSTOLIC BLOOD PRESSURE: 98 MMHG | DIASTOLIC BLOOD PRESSURE: 70 MMHG

## 2018-08-22 VITALS — SYSTOLIC BLOOD PRESSURE: 152 MMHG | DIASTOLIC BLOOD PRESSURE: 77 MMHG

## 2018-08-22 VITALS — SYSTOLIC BLOOD PRESSURE: 175 MMHG | DIASTOLIC BLOOD PRESSURE: 64 MMHG

## 2018-08-22 VITALS — SYSTOLIC BLOOD PRESSURE: 169 MMHG | DIASTOLIC BLOOD PRESSURE: 58 MMHG

## 2018-08-22 VITALS — SYSTOLIC BLOOD PRESSURE: 140 MMHG | DIASTOLIC BLOOD PRESSURE: 54 MMHG

## 2018-08-22 VITALS — SYSTOLIC BLOOD PRESSURE: 159 MMHG | DIASTOLIC BLOOD PRESSURE: 57 MMHG

## 2018-08-22 VITALS — SYSTOLIC BLOOD PRESSURE: 149 MMHG | DIASTOLIC BLOOD PRESSURE: 66 MMHG

## 2018-08-22 VITALS — SYSTOLIC BLOOD PRESSURE: 125 MMHG | DIASTOLIC BLOOD PRESSURE: 63 MMHG

## 2018-08-22 VITALS — DIASTOLIC BLOOD PRESSURE: 58 MMHG | SYSTOLIC BLOOD PRESSURE: 138 MMHG

## 2018-08-22 VITALS — DIASTOLIC BLOOD PRESSURE: 63 MMHG | SYSTOLIC BLOOD PRESSURE: 130 MMHG

## 2018-08-22 VITALS — SYSTOLIC BLOOD PRESSURE: 146 MMHG | DIASTOLIC BLOOD PRESSURE: 46 MMHG

## 2018-08-22 VITALS — DIASTOLIC BLOOD PRESSURE: 58 MMHG | SYSTOLIC BLOOD PRESSURE: 128 MMHG

## 2018-08-22 VITALS — DIASTOLIC BLOOD PRESSURE: 52 MMHG | SYSTOLIC BLOOD PRESSURE: 128 MMHG

## 2018-08-22 VITALS — SYSTOLIC BLOOD PRESSURE: 157 MMHG | DIASTOLIC BLOOD PRESSURE: 52 MMHG

## 2018-08-22 VITALS — SYSTOLIC BLOOD PRESSURE: 124 MMHG | DIASTOLIC BLOOD PRESSURE: 60 MMHG

## 2018-08-22 VITALS — DIASTOLIC BLOOD PRESSURE: 55 MMHG | SYSTOLIC BLOOD PRESSURE: 125 MMHG

## 2018-08-22 VITALS — DIASTOLIC BLOOD PRESSURE: 56 MMHG | SYSTOLIC BLOOD PRESSURE: 141 MMHG

## 2018-08-22 VITALS — DIASTOLIC BLOOD PRESSURE: 65 MMHG | SYSTOLIC BLOOD PRESSURE: 174 MMHG

## 2018-08-22 VITALS — SYSTOLIC BLOOD PRESSURE: 137 MMHG | DIASTOLIC BLOOD PRESSURE: 61 MMHG

## 2018-08-22 VITALS — DIASTOLIC BLOOD PRESSURE: 62 MMHG | SYSTOLIC BLOOD PRESSURE: 165 MMHG

## 2018-08-22 VITALS — DIASTOLIC BLOOD PRESSURE: 76 MMHG | SYSTOLIC BLOOD PRESSURE: 140 MMHG

## 2018-08-22 VITALS — DIASTOLIC BLOOD PRESSURE: 55 MMHG | SYSTOLIC BLOOD PRESSURE: 137 MMHG

## 2018-08-22 VITALS — SYSTOLIC BLOOD PRESSURE: 134 MMHG | DIASTOLIC BLOOD PRESSURE: 58 MMHG

## 2018-08-22 VITALS — DIASTOLIC BLOOD PRESSURE: 60 MMHG | SYSTOLIC BLOOD PRESSURE: 155 MMHG

## 2018-08-22 VITALS — SYSTOLIC BLOOD PRESSURE: 151 MMHG | DIASTOLIC BLOOD PRESSURE: 56 MMHG

## 2018-08-22 VITALS — DIASTOLIC BLOOD PRESSURE: 63 MMHG | SYSTOLIC BLOOD PRESSURE: 178 MMHG

## 2018-08-22 VITALS — SYSTOLIC BLOOD PRESSURE: 161 MMHG | DIASTOLIC BLOOD PRESSURE: 57 MMHG

## 2018-08-22 VITALS — DIASTOLIC BLOOD PRESSURE: 55 MMHG | SYSTOLIC BLOOD PRESSURE: 165 MMHG

## 2018-08-22 VITALS — DIASTOLIC BLOOD PRESSURE: 76 MMHG | SYSTOLIC BLOOD PRESSURE: 171 MMHG

## 2018-08-22 VITALS — SYSTOLIC BLOOD PRESSURE: 136 MMHG | DIASTOLIC BLOOD PRESSURE: 57 MMHG

## 2018-08-22 VITALS — DIASTOLIC BLOOD PRESSURE: 56 MMHG | SYSTOLIC BLOOD PRESSURE: 151 MMHG

## 2018-08-22 VITALS — SYSTOLIC BLOOD PRESSURE: 185 MMHG | DIASTOLIC BLOOD PRESSURE: 58 MMHG

## 2018-08-22 VITALS — SYSTOLIC BLOOD PRESSURE: 112 MMHG | DIASTOLIC BLOOD PRESSURE: 53 MMHG

## 2018-08-22 VITALS — SYSTOLIC BLOOD PRESSURE: 170 MMHG | DIASTOLIC BLOOD PRESSURE: 53 MMHG

## 2018-08-22 VITALS — SYSTOLIC BLOOD PRESSURE: 152 MMHG | DIASTOLIC BLOOD PRESSURE: 63 MMHG

## 2018-08-22 VITALS — DIASTOLIC BLOOD PRESSURE: 56 MMHG | SYSTOLIC BLOOD PRESSURE: 143 MMHG

## 2018-08-22 VITALS — SYSTOLIC BLOOD PRESSURE: 131 MMHG | DIASTOLIC BLOOD PRESSURE: 57 MMHG

## 2018-08-22 VITALS — SYSTOLIC BLOOD PRESSURE: 160 MMHG | DIASTOLIC BLOOD PRESSURE: 61 MMHG

## 2018-08-22 VITALS — SYSTOLIC BLOOD PRESSURE: 148 MMHG | DIASTOLIC BLOOD PRESSURE: 58 MMHG

## 2018-08-22 VITALS — SYSTOLIC BLOOD PRESSURE: 157 MMHG | DIASTOLIC BLOOD PRESSURE: 58 MMHG

## 2018-08-22 VITALS — DIASTOLIC BLOOD PRESSURE: 64 MMHG | SYSTOLIC BLOOD PRESSURE: 185 MMHG

## 2018-08-22 VITALS — SYSTOLIC BLOOD PRESSURE: 157 MMHG | DIASTOLIC BLOOD PRESSURE: 57 MMHG

## 2018-08-22 VITALS — SYSTOLIC BLOOD PRESSURE: 137 MMHG | DIASTOLIC BLOOD PRESSURE: 49 MMHG

## 2018-08-22 VITALS — SYSTOLIC BLOOD PRESSURE: 165 MMHG | DIASTOLIC BLOOD PRESSURE: 62 MMHG

## 2018-08-22 VITALS — DIASTOLIC BLOOD PRESSURE: 49 MMHG | SYSTOLIC BLOOD PRESSURE: 130 MMHG

## 2018-08-22 VITALS — DIASTOLIC BLOOD PRESSURE: 49 MMHG | SYSTOLIC BLOOD PRESSURE: 141 MMHG

## 2018-08-22 VITALS — SYSTOLIC BLOOD PRESSURE: 145 MMHG | DIASTOLIC BLOOD PRESSURE: 52 MMHG

## 2018-08-22 VITALS — DIASTOLIC BLOOD PRESSURE: 54 MMHG | SYSTOLIC BLOOD PRESSURE: 134 MMHG

## 2018-08-22 VITALS — SYSTOLIC BLOOD PRESSURE: 150 MMHG | DIASTOLIC BLOOD PRESSURE: 56 MMHG

## 2018-08-22 VITALS — SYSTOLIC BLOOD PRESSURE: 176 MMHG | DIASTOLIC BLOOD PRESSURE: 59 MMHG

## 2018-08-22 VITALS — DIASTOLIC BLOOD PRESSURE: 54 MMHG | SYSTOLIC BLOOD PRESSURE: 140 MMHG

## 2018-08-22 VITALS — DIASTOLIC BLOOD PRESSURE: 66 MMHG | SYSTOLIC BLOOD PRESSURE: 145 MMHG

## 2018-08-22 VITALS — DIASTOLIC BLOOD PRESSURE: 62 MMHG | SYSTOLIC BLOOD PRESSURE: 156 MMHG

## 2018-08-22 VITALS — DIASTOLIC BLOOD PRESSURE: 59 MMHG | SYSTOLIC BLOOD PRESSURE: 158 MMHG

## 2018-08-22 VITALS — SYSTOLIC BLOOD PRESSURE: 167 MMHG | DIASTOLIC BLOOD PRESSURE: 63 MMHG

## 2018-08-22 VITALS — DIASTOLIC BLOOD PRESSURE: 79 MMHG | SYSTOLIC BLOOD PRESSURE: 143 MMHG

## 2018-08-22 VITALS — DIASTOLIC BLOOD PRESSURE: 70 MMHG | SYSTOLIC BLOOD PRESSURE: 156 MMHG

## 2018-08-22 VITALS — DIASTOLIC BLOOD PRESSURE: 59 MMHG | SYSTOLIC BLOOD PRESSURE: 157 MMHG

## 2018-08-22 VITALS — DIASTOLIC BLOOD PRESSURE: 62 MMHG | SYSTOLIC BLOOD PRESSURE: 157 MMHG

## 2018-08-22 VITALS — DIASTOLIC BLOOD PRESSURE: 49 MMHG | SYSTOLIC BLOOD PRESSURE: 157 MMHG

## 2018-08-22 VITALS — SYSTOLIC BLOOD PRESSURE: 121 MMHG | DIASTOLIC BLOOD PRESSURE: 67 MMHG

## 2018-08-22 VITALS — DIASTOLIC BLOOD PRESSURE: 60 MMHG | SYSTOLIC BLOOD PRESSURE: 169 MMHG

## 2018-08-22 VITALS — DIASTOLIC BLOOD PRESSURE: 55 MMHG | SYSTOLIC BLOOD PRESSURE: 136 MMHG

## 2018-08-22 VITALS — SYSTOLIC BLOOD PRESSURE: 103 MMHG | DIASTOLIC BLOOD PRESSURE: 56 MMHG

## 2018-08-22 VITALS — DIASTOLIC BLOOD PRESSURE: 57 MMHG | SYSTOLIC BLOOD PRESSURE: 166 MMHG

## 2018-08-22 VITALS — DIASTOLIC BLOOD PRESSURE: 85 MMHG | SYSTOLIC BLOOD PRESSURE: 145 MMHG

## 2018-08-22 VITALS — SYSTOLIC BLOOD PRESSURE: 160 MMHG | DIASTOLIC BLOOD PRESSURE: 63 MMHG

## 2018-08-22 VITALS — SYSTOLIC BLOOD PRESSURE: 159 MMHG | DIASTOLIC BLOOD PRESSURE: 54 MMHG

## 2018-08-22 VITALS — DIASTOLIC BLOOD PRESSURE: 81 MMHG | SYSTOLIC BLOOD PRESSURE: 104 MMHG

## 2018-08-22 VITALS — DIASTOLIC BLOOD PRESSURE: 60 MMHG | SYSTOLIC BLOOD PRESSURE: 176 MMHG

## 2018-08-22 VITALS — SYSTOLIC BLOOD PRESSURE: 168 MMHG | DIASTOLIC BLOOD PRESSURE: 62 MMHG

## 2018-08-22 VITALS — DIASTOLIC BLOOD PRESSURE: 53 MMHG | SYSTOLIC BLOOD PRESSURE: 146 MMHG

## 2018-08-22 VITALS — DIASTOLIC BLOOD PRESSURE: 69 MMHG | SYSTOLIC BLOOD PRESSURE: 138 MMHG

## 2018-08-22 VITALS — DIASTOLIC BLOOD PRESSURE: 89 MMHG | SYSTOLIC BLOOD PRESSURE: 116 MMHG

## 2018-08-22 VITALS — DIASTOLIC BLOOD PRESSURE: 60 MMHG | SYSTOLIC BLOOD PRESSURE: 130 MMHG

## 2018-08-22 VITALS — DIASTOLIC BLOOD PRESSURE: 60 MMHG | SYSTOLIC BLOOD PRESSURE: 162 MMHG

## 2018-08-22 LAB
ALBUMIN SERPL-MCNC: 2.7 G/DL (ref 3.5–5)
ALBUMIN/GLOB SERPL: 0.8 {RATIO} (ref 0.8–2)
ALP SERPL-CCNC: 211 IU/L (ref 40–150)
ALT SERPL-CCNC: 35 IU/L (ref 0–55)
ANION GAP SERPL CALC-SCNC: 17.8 MMOL/L (ref 8–16)
BASOPHILS # BLD AUTO: 0 10*3/UL (ref 0–0.1)
BASOPHILS NFR BLD AUTO: 0.2 % (ref 0–1)
BUN SERPL-MCNC: 23 MG/DL (ref 7–26)
BUN/CREAT SERPL: 20 (ref 6–25)
CALCIUM SERPL-MCNC: 8.8 MG/DL (ref 8.4–10.2)
CHLORIDE SERPL-SCNC: 103 MMOL/L (ref 98–107)
CO2 SERPL-SCNC: 22 MMOL/L (ref 22–29)
DEPRECATED APTT PLAS QN: 106.1 SECONDS (ref 23.8–35.5)
DEPRECATED INR PLAS: 1.23
DEPRECATED NEUTROPHILS # BLD AUTO: 9.9 10*3/UL (ref 2.1–6.9)
EGFRCR SERPLBLD CKD-EPI 2021: > 60 ML/MIN (ref 60–?)
EOSINOPHIL # BLD AUTO: 0 10*3/UL (ref 0–0.4)
EOSINOPHIL NFR BLD AUTO: 0 % (ref 0–6)
ERYTHROCYTE [DISTWIDTH] IN CORD BLOOD: 15.9 % (ref 11.7–14.4)
GLOBULIN PLAS-MCNC: 3.5 G/DL (ref 2.3–3.5)
GLUCOSE SERPLBLD-MCNC: 139 MG/DL (ref 74–118)
HCT VFR BLD AUTO: 39.1 % (ref 38.2–49.6)
HGB BLD-MCNC: 12.7 G/DL (ref 14–18)
LYMPHOCYTES # BLD: 0.7 10*3/UL (ref 1–3.2)
LYMPHOCYTES NFR BLD AUTO: 5.9 % (ref 18–39.1)
MCH RBC QN AUTO: 30.6 PG (ref 28–32)
MCHC RBC AUTO-ENTMCNC: 32.5 G/DL (ref 31–35)
MCV RBC AUTO: 94.2 FL (ref 81–99)
MONOCYTES # BLD AUTO: 1 10*3/UL (ref 0.2–0.8)
MONOCYTES NFR BLD AUTO: 8.3 % (ref 4.4–11.3)
NEUTS SEG NFR BLD AUTO: 85.2 % (ref 38.7–80)
PLATELET # BLD AUTO: 162 X10E3/UL (ref 140–360)
POTASSIUM SERPL-SCNC: 3.8 MMOL/L (ref 3.5–5.1)
PROTHROMBIN TIME: 14.6 SECONDS (ref 11.9–14.5)
RBC # BLD AUTO: 4.15 X10E6/UL (ref 4.3–5.7)
SODIUM SERPL-SCNC: 139 MMOL/L (ref 136–145)

## 2018-08-22 RX ADMIN — Medication SCH MG: at 20:11

## 2018-08-22 RX ADMIN — FUROSEMIDE SCH MG: 20 TABLET ORAL at 17:00

## 2018-08-22 RX ADMIN — Medication SCH MG: at 09:00

## 2018-08-22 RX ADMIN — INSULIN HUMAN SCH UNIT: 100 INJECTION, SOLUTION PARENTERAL at 07:30

## 2018-08-22 RX ADMIN — ASPIRIN 81 MG CHEWABLE TABLET SCH MG: 81 TABLET CHEWABLE at 09:00

## 2018-08-22 RX ADMIN — CLONAZEPAM PRN MG: 0.5 TABLET ORAL at 01:25

## 2018-08-22 RX ADMIN — INSULIN HUMAN SCH UNIT: 100 INJECTION, SOLUTION PARENTERAL at 20:11

## 2018-08-22 RX ADMIN — LATANOPROST SCH ML: 50 SOLUTION OPHTHALMIC at 20:11

## 2018-08-22 RX ADMIN — INSULIN HUMAN SCH UNIT: 100 INJECTION, SOLUTION PARENTERAL at 11:30

## 2018-08-22 RX ADMIN — MUPIROCIN SCH GM: 20 OINTMENT TOPICAL at 10:22

## 2018-08-22 RX ADMIN — INSULIN HUMAN SCH UNIT: 100 INJECTION, SOLUTION PARENTERAL at 16:30

## 2018-08-22 RX ADMIN — MEMANTINE HYDROCHLORIDE SCH MG: 10 TABLET ORAL at 17:00

## 2018-08-22 RX ADMIN — MEMANTINE HYDROCHLORIDE SCH MG: 10 TABLET ORAL at 09:00

## 2018-08-22 RX ADMIN — Medication PRN MG: at 14:54

## 2018-08-22 RX ADMIN — PANTOPRAZOLE SODIUM SCH MG: 40 TABLET, DELAYED RELEASE ORAL at 09:00

## 2018-08-22 RX ADMIN — Medication PRN MG: at 09:10

## 2018-08-22 RX ADMIN — HEPARIN SODIUM SCH MLS/HR: 10000 INJECTION, SOLUTION INTRAVENOUS at 20:10

## 2018-08-22 RX ADMIN — Medication SCH MG: at 15:00

## 2018-08-22 RX ADMIN — METOPROLOL TARTRATE SCH MG: 25 TABLET, FILM COATED ORAL at 09:00

## 2018-08-22 RX ADMIN — FUROSEMIDE SCH MG: 20 TABLET ORAL at 09:00

## 2018-08-22 RX ADMIN — TAMSULOSIN HYDROCHLORIDE SCH MG: 0.4 CAPSULE ORAL at 09:00

## 2018-08-22 NOTE — PROGRESS NOTE
DATE:  August 22, 2018



CARDIOLOGY PROGRESS NOTE



SUBJECTIVE:  No major events overnight.  Remains confused.  Left lower 

extremity sheath pulled with medium size hematoma and also cool left lower 

extremity below-the-knee.  Heparin drip resumed.



REVIEW OF SYSTEMS:  Could not be performed due to altered mental status.



OBJECTIVE

VITAL SIGNS:  Pulse 80, respiratory rate 20, blood pressure 146/53, and 

satting 97% on room air.

GENERAL:  Elderly man, in mild distress.

CARDIOVASCULAR:  Regular rate and rhythm.  No murmurs, rubs, or gallops.

EXTREMITIES:  Right lower extremity palpable femoral pulse on the right, 

warm up until below the midpoint of the shin where the extremity is cool.  

Left lower extremity femoral pulse is not palpable.  There is a mild or 

medium size hematoma overlying the left femoral artery.  No pulse is 

palpable below this level in the left lower extremity.  Left lower 

extremity is cold and mottled below the level of the knee.  Bilateral 

transmetatarsal amputations also noted which are old. 



LABORATORY DATA:  Reviewed.



IMAGING DATA:  Reviewed.



TELEMETRY DATA:  Reviewed.



ASSESSMENT AND PLAN

1. Acute limb ischemia of the right lower extremity, status post 

transcatheter lysis.

2. Acute limb ischemia of the left lower extremity.

3. Peripheral arterial disease with prior bilateral transmetatarsal 

amputations.

4. Hypertension.

5. Atrial fibrillation, on anticoagulation.

6. Dementia.



RECOMMENDATIONS:  Right lower extremity was thrombolysed.  However, now the 

left lower extremity is cool and likely has acute ischemia below-the-knee.  

Plan is for peripheral angiography and intervention done by Dr. Pedroza later 

today.  Continue IV heparin.  Patient has known prior PAD and also has 

atrial fibrillation.  Was off heparin drip for short period of time due to 

sheath removal and this may have precipitated either embolic or thrombotic 

occlusion and embolism of the left lower extremity small vessels.  

Prognosis of the left lower extremity is overall poor and he may need BKA 

eventually.  We will continue to follow closely.



Thank you for this consult.







DD:  08/22/2018 10:23

DT:  08/22/2018 10:53

Job#:  [id number] MARQUIS

## 2018-08-22 NOTE — PROGRESS NOTE
DATE:  August 22, 2018 



CARDIOLOGY UPDATE NOTE



Patient was initially planned for repeat peripheral angiogram today.  

Unfortunately, due to technical difficulties, the cath lab was nonfunctional.  

The left common femoral sheath therefore was removed at bedside.  At 

shift change, the patient was noted to have loss of Dopplerable pulses in his 

left lower extremity with coolness to palpation.  Patient was started on heparin

drip and STAT arterial Doppler was obtained, which demonstrated complete 

occlusion of the right popliteal, posterior tibial, anterior tibial and 
dorsalis 

pedis arteries.  There was a hematoma present near the left common femoral

artery.  The mid to distal femoral, popliteal, posterior tibial, anterior 
tibial and 

dorsalis pedis arteries on the left appeared to be occluded.  On exam, the 

patient's left lower extremity is cold to the knee and the right lower 
extremity is

cool to the mid calf.  Acute limb ischemia suspected. Case was discussed with 

Dr. Moore. As the cath lab is reported to be up and running again, the patient 
is 

planned to return to the cath lab later today for attempted revascularization. 

Continue heparin drip in the meantime. The patient's son who was at bedside 

was updated regarding the patient's condition as well as poor limb prognosis.  

Risks and benefits of peripheral angiogram were discussed with the patient's 

son who agrees to proceed.  Keep patient NPO. Limb prognosis is poor. AM

labs ordered. We will continue to follow.









DD:  08/22/2018 00:36

DT:  08/22/2018 00:52

Job#:  [id number] KARAN AGUILAR

## 2018-08-22 NOTE — PROGRESS NOTE
DATE:  August 22, 2018



SUBJECTIVE:  Patient now has left lower extremity cold extremity concerning 

for an arterial clot.  He is scheduled later today for a lower extremity 

angiogram.  I did speak with cardiology and they are concerned that he may 

need a BKA, possibly bilaterally.  Patient is still very confused and 

occasionally would get very combative.



OBJECTIVE

VITAL SIGNS:  Temperature is 98.1, pulse is 80, blood pressure 165/55, 

pulse ox 93% on room air.



LABORATORY DATA:  Lab findings show white count 11.2, hemoglobin 12.7, 

hematocrit of 39, and platelets of 162.  Coagulation; PTT last one is 

115.6.  Chemistry; sodium 139, potassium 3.8, chloride 103, bicarb 22, 

anion gap of 17, BUN 23, creatinine is 1.1, calcium 8.8, total bilirubin is 

2.2, AST 41, ALT 35, alk phos 211, total protein 6.2, albumin 2.7.



MICROBIOLOGY:  Blood cultures first one negative, second one was likely a 

contaminant.



IMAGING STUDIES:  None.



PHYSICAL EXAMINATION

GENERAL:  Not in acute distress, alert and oriented x2, cooperative on 

examination.

HEENT:  Head, normocephalic, atraumatic.  Eyes; pupils equal and reactive 

to light bilaterally.  Extraocular movements are intact bilaterally.  

Throat; no evidence of any erythema or exudates in the posterior pharynx.  

Has poor dentition.

NECK:  Supple with good range of motion.

PULMONARY:  Clear to auscultation bilaterally.  No wheezing, no rales, no 

rhonchi, no crackles appreciated.

CARDIOVASCULAR:  Positive S1, S2.  No murmurs, rubs, or gallops 

appreciated.

ABDOMEN:  Soft, nondistended, and nontender to palpation.  Bowel sounds 

present.

MUSCULOSKELETAL:  Strength is 5/5 throughout.

NEUROLOGICAL:  He is alert and oriented x2.

SKIN:  Intact.  Warm to touch.  Good capillary refill.

PSYCHIATRIC:  Normal affect and mood.

EXTREMITIES:  He has left lower extremity cold sensation appreciated.



IMPRESSION

1. Right lower extremity acute ischemic limb, status post right lower 

extremity angiogram and arterectomy performed on August 20, 2018, with 

sheath removed on August 21, 2018, now with a left lower extremity cold 

extremity.

2. Dementia with sundowning.

3. Hypertension.

4. Peripheral neuropathy.

5. Chronic pain syndrome.



PLAN:  At this time, he is scheduled for lower extremity angiogram 

bilaterally later today in the cath room.  Now, his left lower extremity 

has very cold sensation and very concerning for possible needing a BKA.  I 

discussed this with cardiology, but we will await for the final findings.  

We will continue with Seroquel for agitation.  Resume same home 

medications.  I suspect the patient likely has some underlying dementia 

that has been ongoing for several years, but the family is in denial and 

refuses to accept it.  At this time, we will continue with same plan of 

care.  We will continue to monitor.









DD:  08/22/2018 16:26

DT:  08/22/2018 17:10

Job#:  [id number] MARQUIS

## 2018-08-23 VITALS — DIASTOLIC BLOOD PRESSURE: 58 MMHG | SYSTOLIC BLOOD PRESSURE: 134 MMHG

## 2018-08-23 VITALS — DIASTOLIC BLOOD PRESSURE: 58 MMHG | SYSTOLIC BLOOD PRESSURE: 151 MMHG

## 2018-08-23 VITALS — DIASTOLIC BLOOD PRESSURE: 56 MMHG | SYSTOLIC BLOOD PRESSURE: 157 MMHG

## 2018-08-23 VITALS — SYSTOLIC BLOOD PRESSURE: 152 MMHG | DIASTOLIC BLOOD PRESSURE: 59 MMHG

## 2018-08-23 VITALS — DIASTOLIC BLOOD PRESSURE: 65 MMHG | SYSTOLIC BLOOD PRESSURE: 144 MMHG

## 2018-08-23 VITALS — DIASTOLIC BLOOD PRESSURE: 71 MMHG | SYSTOLIC BLOOD PRESSURE: 145 MMHG

## 2018-08-23 VITALS — DIASTOLIC BLOOD PRESSURE: 56 MMHG | SYSTOLIC BLOOD PRESSURE: 163 MMHG

## 2018-08-23 VITALS — SYSTOLIC BLOOD PRESSURE: 153 MMHG | DIASTOLIC BLOOD PRESSURE: 57 MMHG

## 2018-08-23 VITALS — SYSTOLIC BLOOD PRESSURE: 129 MMHG | DIASTOLIC BLOOD PRESSURE: 37 MMHG

## 2018-08-23 VITALS — DIASTOLIC BLOOD PRESSURE: 123 MMHG | SYSTOLIC BLOOD PRESSURE: 148 MMHG

## 2018-08-23 VITALS — SYSTOLIC BLOOD PRESSURE: 167 MMHG | DIASTOLIC BLOOD PRESSURE: 67 MMHG

## 2018-08-23 VITALS — SYSTOLIC BLOOD PRESSURE: 137 MMHG | DIASTOLIC BLOOD PRESSURE: 52 MMHG

## 2018-08-23 VITALS — SYSTOLIC BLOOD PRESSURE: 162 MMHG | DIASTOLIC BLOOD PRESSURE: 55 MMHG

## 2018-08-23 VITALS — SYSTOLIC BLOOD PRESSURE: 147 MMHG | DIASTOLIC BLOOD PRESSURE: 55 MMHG

## 2018-08-23 VITALS — DIASTOLIC BLOOD PRESSURE: 76 MMHG | SYSTOLIC BLOOD PRESSURE: 137 MMHG

## 2018-08-23 VITALS — SYSTOLIC BLOOD PRESSURE: 139 MMHG | DIASTOLIC BLOOD PRESSURE: 72 MMHG

## 2018-08-23 VITALS — SYSTOLIC BLOOD PRESSURE: 137 MMHG | DIASTOLIC BLOOD PRESSURE: 46 MMHG

## 2018-08-23 VITALS — DIASTOLIC BLOOD PRESSURE: 55 MMHG | SYSTOLIC BLOOD PRESSURE: 161 MMHG

## 2018-08-23 VITALS — SYSTOLIC BLOOD PRESSURE: 144 MMHG | DIASTOLIC BLOOD PRESSURE: 62 MMHG

## 2018-08-23 VITALS — DIASTOLIC BLOOD PRESSURE: 50 MMHG | SYSTOLIC BLOOD PRESSURE: 131 MMHG

## 2018-08-23 VITALS — DIASTOLIC BLOOD PRESSURE: 49 MMHG | SYSTOLIC BLOOD PRESSURE: 138 MMHG

## 2018-08-23 VITALS — DIASTOLIC BLOOD PRESSURE: 48 MMHG | SYSTOLIC BLOOD PRESSURE: 127 MMHG

## 2018-08-23 VITALS — SYSTOLIC BLOOD PRESSURE: 172 MMHG | DIASTOLIC BLOOD PRESSURE: 53 MMHG

## 2018-08-23 VITALS — DIASTOLIC BLOOD PRESSURE: 54 MMHG | SYSTOLIC BLOOD PRESSURE: 157 MMHG

## 2018-08-23 VITALS — DIASTOLIC BLOOD PRESSURE: 57 MMHG | SYSTOLIC BLOOD PRESSURE: 134 MMHG

## 2018-08-23 VITALS — SYSTOLIC BLOOD PRESSURE: 158 MMHG | DIASTOLIC BLOOD PRESSURE: 56 MMHG

## 2018-08-23 VITALS — SYSTOLIC BLOOD PRESSURE: 165 MMHG | DIASTOLIC BLOOD PRESSURE: 60 MMHG

## 2018-08-23 VITALS — SYSTOLIC BLOOD PRESSURE: 163 MMHG | DIASTOLIC BLOOD PRESSURE: 64 MMHG

## 2018-08-23 VITALS — SYSTOLIC BLOOD PRESSURE: 149 MMHG | DIASTOLIC BLOOD PRESSURE: 58 MMHG

## 2018-08-23 VITALS — SYSTOLIC BLOOD PRESSURE: 171 MMHG | DIASTOLIC BLOOD PRESSURE: 60 MMHG

## 2018-08-23 VITALS — DIASTOLIC BLOOD PRESSURE: 139 MMHG | SYSTOLIC BLOOD PRESSURE: 144 MMHG

## 2018-08-23 VITALS — DIASTOLIC BLOOD PRESSURE: 59 MMHG | SYSTOLIC BLOOD PRESSURE: 138 MMHG

## 2018-08-23 VITALS — SYSTOLIC BLOOD PRESSURE: 115 MMHG | DIASTOLIC BLOOD PRESSURE: 50 MMHG

## 2018-08-23 VITALS — DIASTOLIC BLOOD PRESSURE: 59 MMHG | SYSTOLIC BLOOD PRESSURE: 136 MMHG

## 2018-08-23 VITALS — SYSTOLIC BLOOD PRESSURE: 130 MMHG | DIASTOLIC BLOOD PRESSURE: 56 MMHG

## 2018-08-23 VITALS — DIASTOLIC BLOOD PRESSURE: 68 MMHG | SYSTOLIC BLOOD PRESSURE: 147 MMHG

## 2018-08-23 VITALS — SYSTOLIC BLOOD PRESSURE: 147 MMHG | DIASTOLIC BLOOD PRESSURE: 50 MMHG

## 2018-08-23 VITALS — SYSTOLIC BLOOD PRESSURE: 145 MMHG | DIASTOLIC BLOOD PRESSURE: 87 MMHG

## 2018-08-23 VITALS — DIASTOLIC BLOOD PRESSURE: 46 MMHG | SYSTOLIC BLOOD PRESSURE: 150 MMHG

## 2018-08-23 VITALS — SYSTOLIC BLOOD PRESSURE: 138 MMHG | DIASTOLIC BLOOD PRESSURE: 57 MMHG

## 2018-08-23 VITALS — DIASTOLIC BLOOD PRESSURE: 60 MMHG | SYSTOLIC BLOOD PRESSURE: 146 MMHG

## 2018-08-23 VITALS — DIASTOLIC BLOOD PRESSURE: 66 MMHG | SYSTOLIC BLOOD PRESSURE: 160 MMHG

## 2018-08-23 VITALS — SYSTOLIC BLOOD PRESSURE: 170 MMHG | DIASTOLIC BLOOD PRESSURE: 68 MMHG

## 2018-08-23 LAB
ALBUMIN SERPL-MCNC: 2.8 G/DL (ref 3.5–5)
ALBUMIN/GLOB SERPL: 0.8 {RATIO} (ref 0.8–2)
ALP SERPL-CCNC: 214 IU/L (ref 40–150)
ALT SERPL-CCNC: 45 IU/L (ref 0–55)
ANION GAP SERPL CALC-SCNC: 14.4 MMOL/L (ref 8–16)
BASOPHILS # BLD AUTO: 0 10*3/UL (ref 0–0.1)
BASOPHILS NFR BLD AUTO: 0.2 % (ref 0–1)
BUN SERPL-MCNC: 23 MG/DL (ref 7–26)
BUN/CREAT SERPL: 25 (ref 6–25)
CALCIUM SERPL-MCNC: 8.8 MG/DL (ref 8.4–10.2)
CHLORIDE SERPL-SCNC: 103 MMOL/L (ref 98–107)
CO2 SERPL-SCNC: 27 MMOL/L (ref 22–29)
DEPRECATED NEUTROPHILS # BLD AUTO: 6.7 10*3/UL (ref 2.1–6.9)
EGFRCR SERPLBLD CKD-EPI 2021: > 60 ML/MIN (ref 60–?)
EOSINOPHIL # BLD AUTO: 0 10*3/UL (ref 0–0.4)
EOSINOPHIL NFR BLD AUTO: 0.5 % (ref 0–6)
ERYTHROCYTE [DISTWIDTH] IN CORD BLOOD: 16.1 % (ref 11.7–14.4)
GLOBULIN PLAS-MCNC: 3.7 G/DL (ref 2.3–3.5)
GLUCOSE SERPLBLD-MCNC: 104 MG/DL (ref 74–118)
HCT VFR BLD AUTO: 36.9 % (ref 38.2–49.6)
HGB BLD-MCNC: 12.1 G/DL (ref 14–18)
LYMPHOCYTES # BLD: 0.6 10*3/UL (ref 1–3.2)
LYMPHOCYTES NFR BLD AUTO: 7.5 % (ref 18–39.1)
MCH RBC QN AUTO: 30.9 PG (ref 28–32)
MCHC RBC AUTO-ENTMCNC: 32.8 G/DL (ref 31–35)
MCV RBC AUTO: 94.1 FL (ref 81–99)
MONOCYTES # BLD AUTO: 0.7 10*3/UL (ref 0.2–0.8)
MONOCYTES NFR BLD AUTO: 8.6 % (ref 4.4–11.3)
NEUTS SEG NFR BLD AUTO: 82.8 % (ref 38.7–80)
PLATELET # BLD AUTO: 194 X10E3/UL (ref 140–360)
POTASSIUM SERPL-SCNC: 3.4 MMOL/L (ref 3.5–5.1)
RBC # BLD AUTO: 3.92 X10E6/UL (ref 4.3–5.7)
SODIUM SERPL-SCNC: 141 MMOL/L (ref 136–145)

## 2018-08-23 PROCEDURE — 3E05317 INTRODUCTION OF OTHER THROMBOLYTIC INTO PERIPHERAL ARTERY, PERCUTANEOUS APPROACH: ICD-10-PCS | Performed by: INTERNAL MEDICINE

## 2018-08-23 PROCEDURE — B41D1ZZ FLUOROSCOPY OF AORTA AND BILATERAL LOWER EXTREMITY ARTERIES USING LOW OSMOLAR CONTRAST: ICD-10-PCS | Performed by: INTERNAL MEDICINE

## 2018-08-23 PROCEDURE — 04CK3ZZ EXTIRPATION OF MATTER FROM RIGHT FEMORAL ARTERY, PERCUTANEOUS APPROACH: ICD-10-PCS | Performed by: INTERNAL MEDICINE

## 2018-08-23 RX ADMIN — INSULIN HUMAN SCH UNIT: 100 INJECTION, SOLUTION PARENTERAL at 07:30

## 2018-08-23 RX ADMIN — INSULIN HUMAN SCH UNIT: 100 INJECTION, SOLUTION PARENTERAL at 16:30

## 2018-08-23 RX ADMIN — MUPIROCIN SCH GM: 20 OINTMENT TOPICAL at 08:41

## 2018-08-23 RX ADMIN — Medication PRN MG: at 12:54

## 2018-08-23 RX ADMIN — Medication SCH MG: at 18:18

## 2018-08-23 RX ADMIN — CLONAZEPAM PRN MG: 0.5 TABLET ORAL at 18:19

## 2018-08-23 RX ADMIN — MEMANTINE HYDROCHLORIDE SCH MG: 10 TABLET ORAL at 08:40

## 2018-08-23 RX ADMIN — TAMSULOSIN HYDROCHLORIDE SCH MG: 0.4 CAPSULE ORAL at 08:40

## 2018-08-23 RX ADMIN — Medication SCH MG: at 15:00

## 2018-08-23 RX ADMIN — PANTOPRAZOLE SODIUM SCH MG: 40 TABLET, DELAYED RELEASE ORAL at 08:41

## 2018-08-23 RX ADMIN — Medication SCH MG: at 08:40

## 2018-08-23 RX ADMIN — MEMANTINE HYDROCHLORIDE SCH MG: 10 TABLET ORAL at 18:18

## 2018-08-23 RX ADMIN — Medication PRN MG: at 04:30

## 2018-08-23 RX ADMIN — INSULIN HUMAN SCH UNIT: 100 INJECTION, SOLUTION PARENTERAL at 11:30

## 2018-08-23 RX ADMIN — FUROSEMIDE SCH MG: 20 TABLET ORAL at 18:18

## 2018-08-23 RX ADMIN — FUROSEMIDE SCH MG: 20 TABLET ORAL at 17:00

## 2018-08-23 RX ADMIN — Medication SCH MG: at 08:41

## 2018-08-23 RX ADMIN — FUROSEMIDE SCH MG: 20 TABLET ORAL at 08:40

## 2018-08-23 RX ADMIN — METOPROLOL TARTRATE SCH MG: 25 TABLET, FILM COATED ORAL at 08:40

## 2018-08-23 RX ADMIN — ASPIRIN 81 MG CHEWABLE TABLET SCH MG: 81 TABLET CHEWABLE at 08:40

## 2018-08-23 RX ADMIN — MEMANTINE HYDROCHLORIDE SCH MG: 10 TABLET ORAL at 17:00

## 2018-08-23 NOTE — OPERATIVE REPORT
DATE OF PROCEDURE:  August 23, 2018 



PROCEDURE:  Cardiac catheterization.



INDICATIONS FOR PROCEDURE:  Acute limb ischemia.



PRE-SEDATION ASSESSMENT:  Medical history, social history, previous 

experience with anesthesia was reviewed and documented in the preoperative 

record.  Results of irrelevant diagnostic studies were reviewed.  Planned 

choice of anesthesia, risks, complications, benefits, and alternatives were 

discussed.  Patient deemed an appropriate candidate for moderate sedation. 



CONSENT:  The benefits, risks, complications, and alternatives of the 

procedure were discussed with the patient and his family.  Informed consent 

was obtained prior to the procedure.



MEDICATIONS:  Please see nursing notes for medications administered during 

the procedure. 



PROCEDURE:  Patient was brought to the cardiac catheterization laboratory 

in a fasting state.  Right groin was prepped and draped in a sterile 

fashion.  One percent lidocaine was used to infiltrate the right groin over 

the right common femoral artery.  A 6-Iraqi sheath was inserted in the 

right common femoral artery using the modified Seldinger technique and 

micropuncture access equipment.  Peripheral angiography and runoff of the 

right lower extremity was performed through the 6-Iraqi sheath.  A glide 

Advantage wire and Omni flush catheter were used to place the Omni flush 

catheter in the abdominal aorta. Abdominal aortogram with runoff was 

performed for angiography of the iliac.  The glide Advantage wire was then 

used to go through the Omni flush catheter in an up and over fashion into 

the left common iliac and external iliac and common femoral arteries.  The 

Omni flush catheter was advanced over the wire to the left common femoral 

artery.  Further angiography with digital subtraction imaging was performed 

of the left lower extremity.  After reviewing the angiogram carefully and 

discussing it with my partner, Dr. Moore, decision was made to transfer 

the patient to [facility] for vascular surgery consultation 

and intervention.  All catheters were moved over a guidewire.  Exit site 

was closed using ProGlide suture closure.  The case ended without any 

complications.  Estimated blood loss was approximately 20 mL.

FINDINGS:  Bilateral common iliacs and external iliacs are tortuous.  

However, there is no significant obstructive peripheral arterial disease.  

The right CFA and proximal and mid-SFA are patent.  There is diffuse 

plaquing of the mid-SFA and mid-SFA is completely occluded in the middistal 

portion.  There is only a small vessel collateral runoff with very small 

area of reconstitution all the way in the popliteal artery in a very 

delayed fashion.  On the left, the left common femoral artery is patent and 

without significant stenosis.  There is a very small left profunda that is 

patent.  There is a large pseudoaneurysm present about 10 cm below the 

bifurcation of SFA and profunda.  There is no flow into the SFA beyond this 

pseudoaneurysmal segments.  Of note, this pseudoaneurysmal segment is well 

below the access site from previous catheterization.  The occlusion of the 

SFA here is likely acute as there is very little flow below the knee.  The 

DSA and waiting for runoff for a long time. 



COMPLICATIONS:  None.



SPECIMEN REMOVED:  None.



IMPLANTS:  ProGlide vascular closure.



ESTIMATED BLOOD LOSS:  20 mL.



RECOMMENDATIONS

1.  Resume heparin drip.  No bolus.

2.  Transferred to vascular surgery,  _______ at [facility].









DD:  08/23/2018 10:08

DT:  08/23/2018 10:32

Job#:  [id number]

## 2018-08-23 NOTE — PROGRESS NOTE
DATE:  August 23, 2018 



SUBJECTIVE:  The patient is in the process of being transferred to [facility] to have a vascular surgery evaluate his lower 

extremities.  It seems that the patient may need amputation below-the-knee 

bilaterally.  Patient is complaining of left lower extremity pain.  He also 

has a headache at the current moment.  We will give him Tylenol.  

Currently, he is awake, alert, and talkative.  His family is at bedside 

with no concerns. 



OBJECTIVE 

VITAL SIGNS:  He is afebrile.  Temperature is 98, pulse 80, respiratory 

rate 16, blood pressure 161/55, and his pulse oxygen is 97% on room air.



LAB FINDINGS:  Show white count of 8.1, hemoglobin 12, hematocrit 37, and 

platelets of 194.  Chemistry; sodium 141, potassium 3.4, chloride 103, 

bicarb 27, anion gap of 14, BUN is 23, creatine is 0.92, glucose is 104.  

His total bilirubin is 2.4, AST 53, ALT 45, alk phos 214, albumin 2.8.



MICROBIOLOGY:  Blood cultures, no growth.  One of two was coag-negative 

staph likely contaminate.



IMAGING STUDIES:  None.



PHYSICAL EXAM

GENERAL:  Not in acute distress.  He is alert and oriented x3, cooperative 

on examination.

HEENT:  Head is normocephalic and atraumatic.  Eyes:  Pupils equal, round 

and reactive to light bilaterally.  Extraocular movements intact 

bilaterally.

NECK:  Supple.  Good range of motion.  Throat with no evidence of any 

erythema or exudates in the posterior pharynx.  Has poor dentition. 

PULMONARY:  Clear to auscultation bilaterally.  No wheezing.  No rales.  No 

rhonchi.  No crackles appreciated. 

CARDIOVASCULAR:  Positive S1 and S2.  No murmurs, rubs or gallops 

appreciated.

ABDOMEN:  Soft, nondistended and nontender to palpation.  Bowel sounds 

present. 

MUSCULOSKELETAL:  Strength is 5/5 throughout.  No evidence of any muscle 

deficit on examination.  No muscle weakness appreciated.

NEUROLOGICAL:  Cranial nerves II-XII are grossly intact.  No evidence of 

any neurological deficits on exam.

SKIN:  Intact.  Warm to touch.  Good cap refill.

PSYCHIATRIC:  Normal affect and mood.

EXTREMITIES:  No edema.  Good range of motion throughout. 



IMPRESSION

1. Right lower extremity acute ischemic limb, status post right lower 

extremity angiogram with atherectomy performed on August 20, 2018, with 

sheath removed on August 21, 2018, now with left lower extremity cold 

extremity, which may need bilateral below knee amputation according to 

cardiology.

2. Dementia with sundowning.

3. Hypertension.

4. Peripheral neuropathy.

5. Chronic pain syndrome.



PLAN:  At this time, there is no further intervention at this facility and 

we will likely need to transfer the patient to [facility] 

to have a vascular surgery evaluate the extremities.  This was initiated by 

cardiology which is likely the best option for this gentleman.  We are 

going to continue with same plan of care.  His urine output is okay, but 

his urine looks very dark in which I will start on IV fluids.  He is 

currently n.p.o. for possible surgery, but I highly doubt that this will 

occur late in this day.





At this time, we will continue with heparin drip, resume same home 

medications with no changes and once accepted, he will be transferred to 

[facility], which I will accept at that facility.  

Otherwise, we will continue with same plan of care.







DD:  08/23/2018 16:17

DT:  08/23/2018 16:54

Job#:  [id number]